# Patient Record
Sex: FEMALE | Race: OTHER | HISPANIC OR LATINO | Employment: OTHER | URBAN - METROPOLITAN AREA
[De-identification: names, ages, dates, MRNs, and addresses within clinical notes are randomized per-mention and may not be internally consistent; named-entity substitution may affect disease eponyms.]

---

## 2023-05-05 ENCOUNTER — HOSPITAL ENCOUNTER (OUTPATIENT)
Dept: DATA CONVERSION | Facility: HOSPITAL | Age: 48
End: 2023-05-05
Attending: PHYSICAL MEDICINE & REHABILITATION | Admitting: PHYSICAL MEDICINE & REHABILITATION
Payer: MEDICARE

## 2023-05-05 DIAGNOSIS — M79.18 MYALGIA, OTHER SITE: ICD-10-CM

## 2023-05-05 DIAGNOSIS — M54.16 RADICULOPATHY, LUMBAR REGION: ICD-10-CM

## 2023-05-05 DIAGNOSIS — M48.062 SPINAL STENOSIS, LUMBAR REGION WITH NEUROGENIC CLAUDICATION: ICD-10-CM

## 2023-09-07 VITALS — BODY MASS INDEX: 32.65 KG/M2 | WEIGHT: 220.46 LBS | HEIGHT: 69 IN

## 2023-09-14 NOTE — H&P
"    History & Physical Reviewed:   Pregnant/Lactating:  ·  Are You Pregnant no (1)   ·  Are You Currently Breastfeeding no (1)     I have reviewed the History and Physical dated:  17-Apr-2023   History and Physical reviewed and relevant findings noted. Patient examined to review pertinent physical  findings.: No significant changes   Home Medications Reviewed: no changes noted   Allergies Reviewed: no changes noted       ERAS (Enhanced Recovery After Surgery):  ·  ERAS Patient: no     Consent:   COVID-19 Consent:  ·  COVID-19 Risk Consent Surgeon has reviewed key risks related to the risk of stephanie COVID-19 and if they contract COVID-19 what the risks are.       Electronic Signatures:  Donell Mondragon (MD)  (Signed 05-May-2023 11:48)   Authored: History & Physical Reviewed, ERAS, Consent,  Note Completion      Last Updated: 05-May-2023 11:48 by Donell Mondragon (MD)    References:  1.  Data Referenced From \"Patient Profile - Procedure\" 05-May-2023 11:41   "

## 2023-09-15 PROBLEM — M54.50 LUMBAGO: Status: ACTIVE | Noted: 2023-09-15

## 2023-09-15 PROBLEM — M48.062 LUMBAR STENOSIS WITH NEUROGENIC CLAUDICATION: Status: ACTIVE | Noted: 2023-09-15

## 2023-09-15 PROBLEM — M54.12 CERVICAL RADICULOPATHY AT C6: Status: ACTIVE | Noted: 2023-09-15

## 2023-09-15 PROBLEM — M96.1 CERVICAL POSTLAMINECTOMY SYNDROME: Status: ACTIVE | Noted: 2023-09-15

## 2023-09-15 PROBLEM — Z79.891 LONG TERM PRESCRIPTION OPIATE USE: Status: ACTIVE | Noted: 2023-09-15

## 2023-09-15 PROBLEM — G54.2 CERVICAL NEUROPATHY: Status: ACTIVE | Noted: 2023-09-15

## 2023-09-15 PROBLEM — M54.16 LUMBAR RADICULOPATHY: Status: ACTIVE | Noted: 2023-09-15

## 2023-09-15 PROBLEM — M51.369 DDD (DEGENERATIVE DISC DISEASE), LUMBAR: Status: ACTIVE | Noted: 2023-09-15

## 2023-09-15 PROBLEM — M54.2 CHRONIC NECK PAIN: Status: ACTIVE | Noted: 2023-09-15

## 2023-09-15 PROBLEM — M51.36 DDD (DEGENERATIVE DISC DISEASE), LUMBAR: Status: ACTIVE | Noted: 2023-09-15

## 2023-09-15 PROBLEM — G89.29 CHRONIC NECK PAIN: Status: ACTIVE | Noted: 2023-09-15

## 2023-09-15 PROBLEM — M50.10 CERVICAL RADICULOPATHY DUE TO INTERVERTEBRAL DISC DISORDER: Status: ACTIVE | Noted: 2023-09-15

## 2023-09-15 PROBLEM — M79.18 MYOFASCIAL PAIN: Status: ACTIVE | Noted: 2023-09-15

## 2023-09-15 PROBLEM — G62.9 PERIPHERAL NEUROPATHY: Status: ACTIVE | Noted: 2023-09-15

## 2023-09-15 RX ORDER — GABAPENTIN 600 MG/1
1 TABLET ORAL 2 TIMES DAILY
COMMUNITY
End: 2023-10-17 | Stop reason: SDUPTHER

## 2023-09-15 RX ORDER — NYSTATIN 100000 U/G
CREAM TOPICAL 2 TIMES DAILY
COMMUNITY
Start: 2022-11-26 | End: 2023-10-17 | Stop reason: ALTCHOICE

## 2023-09-15 RX ORDER — METAXALONE 800 MG/1
1 TABLET ORAL 4 TIMES DAILY PRN
COMMUNITY
Start: 2020-10-21 | End: 2023-10-17 | Stop reason: ALTCHOICE

## 2023-09-15 RX ORDER — ATORVASTATIN CALCIUM 10 MG/1
10 TABLET, FILM COATED ORAL NIGHTLY
COMMUNITY
Start: 2023-02-08

## 2023-09-15 RX ORDER — ISOPROPYL ALCOHOL 70 ML/100ML
SWAB TOPICAL
COMMUNITY

## 2023-09-15 RX ORDER — VITAMIN B COMPLEX
CAPSULE ORAL
COMMUNITY

## 2023-09-15 RX ORDER — GLIPIZIDE 10 MG/1
1 TABLET ORAL EVERY 12 HOURS
COMMUNITY

## 2023-09-15 RX ORDER — LEVOTHYROXINE SODIUM 137 UG/1
50 TABLET ORAL DAILY
COMMUNITY
End: 2023-10-17 | Stop reason: ALTCHOICE

## 2023-09-15 RX ORDER — LIDOCAINE HYDROCHLORIDE MONOHYDRATE 0.5 MG/1
POWDER INTRADERMAL DAILY
COMMUNITY
End: 2024-01-31 | Stop reason: ALTCHOICE

## 2023-09-15 RX ORDER — LAMOTRIGINE 100 MG/1
1 TABLET ORAL 2 TIMES DAILY
COMMUNITY
Start: 2023-02-10

## 2023-09-15 RX ORDER — PANTOPRAZOLE SODIUM 40 MG/1
1 TABLET, DELAYED RELEASE ORAL EVERY 12 HOURS
COMMUNITY

## 2023-09-15 RX ORDER — METHOCARBAMOL 500 MG/1
TABLET, FILM COATED ORAL 3 TIMES DAILY
COMMUNITY
End: 2023-10-17 | Stop reason: SDUPTHER

## 2023-09-15 RX ORDER — SCOLOPAMINE TRANSDERMAL SYSTEM 1 MG/1
1 PATCH, EXTENDED RELEASE TRANSDERMAL
COMMUNITY
Start: 2023-03-09

## 2023-09-15 RX ORDER — LEVOTHYROXINE SODIUM 50 UG/1
TABLET ORAL
COMMUNITY
Start: 2023-04-22

## 2023-09-15 RX ORDER — OXYCODONE AND ACETAMINOPHEN 10; 325 MG/1; MG/1
1 TABLET ORAL 3 TIMES DAILY PRN
COMMUNITY
End: 2023-10-17 | Stop reason: SDUPTHER

## 2023-09-15 RX ORDER — AMITRIPTYLINE HYDROCHLORIDE 50 MG/1
TABLET, FILM COATED ORAL
COMMUNITY
Start: 2023-04-22 | End: 2023-10-17 | Stop reason: ALTCHOICE

## 2023-09-15 RX ORDER — AMLODIPINE BESYLATE 10 MG/1
1 TABLET ORAL DAILY
COMMUNITY
End: 2023-10-17 | Stop reason: ALTCHOICE

## 2023-09-15 RX ORDER — FERROUS GLUCONATE 324(38)MG
1 TABLET ORAL DAILY
COMMUNITY
Start: 2023-01-16

## 2023-09-15 RX ORDER — CITALOPRAM 10 MG/1
1 TABLET ORAL DAILY
COMMUNITY
End: 2023-10-17 | Stop reason: ALTCHOICE

## 2023-09-15 RX ORDER — AZELASTINE HYDROCHLORIDE 0.5 MG/ML
SOLUTION/ DROPS OPHTHALMIC
COMMUNITY
Start: 2020-04-27 | End: 2023-10-17 | Stop reason: ALTCHOICE

## 2023-09-15 RX ORDER — MEDROXYPROGESTERONE ACETATE 10 MG/1
1 TABLET ORAL EVERY MORNING
COMMUNITY
Start: 2022-09-21 | End: 2023-10-17 | Stop reason: ALTCHOICE

## 2023-09-15 RX ORDER — POLYETHYLENE GLYCOL-3350 AND ELECTROLYTES 236; 6.74; 5.86; 2.97; 22.74 G/274.31G; G/274.31G; G/274.31G; G/274.31G; G/274.31G
POWDER, FOR SOLUTION ORAL
COMMUNITY
Start: 2023-05-04 | End: 2023-10-17 | Stop reason: ALTCHOICE

## 2023-09-15 RX ORDER — NALOXONE HYDROCHLORIDE 4 MG/.1ML
SPRAY NASAL
COMMUNITY
Start: 2020-08-26 | End: 2024-01-31 | Stop reason: WASHOUT

## 2023-09-15 RX ORDER — BLOOD SUGAR DIAGNOSTIC
STRIP MISCELLANEOUS
COMMUNITY
Start: 2023-01-24

## 2023-09-15 RX ORDER — PRAZOSIN HYDROCHLORIDE 1 MG/1
CAPSULE ORAL
COMMUNITY
Start: 2023-04-22 | End: 2023-10-17 | Stop reason: ALTCHOICE

## 2023-09-15 RX ORDER — ARIPIPRAZOLE 10 MG/1
1 TABLET ORAL DAILY
COMMUNITY

## 2023-09-15 RX ORDER — METFORMIN HYDROCHLORIDE 1000 MG/1
1 TABLET ORAL 2 TIMES DAILY
COMMUNITY

## 2023-10-02 NOTE — OP NOTE
Post Operative Note:     PreOp Diagnosis: Lumbar stenosis with neurogenic  claudication   Post-Procedure Diagnosis: Lumbar stenosis with neurogenic  claudication   Procedure: 1.  L4-5 interlaminar ALINE  2.   3.   4.   5.   Surgeon: Donell Mondragon MD   Resident/Fellow/Other Assistant: Gerald Pina MD   Estimated Blood Loss (mL): none   Specimen: no   Complications: none apparent   Findings: expected anatomy     Operative Report Dictated:  Dictation: not applicable - note contains Operative  Report   Operative Report:    After informed consent was obtained, the patient was brought to the operating room and placed in the prone position.  The back area was prepped and draped in usual  sterile fashion.  Using fluoroscopic guidance, the skin and subcutaneous tissue overlying needle trajectory to the below interlaminar epidural space were anesthetized with a total of 5 mL of 0.5% lidocaine in the below paraspinous approach.  An 18-gauge  Tuohy needle was then advanced under fluoroscopic guidance with the below paraspinous approach into the below  interlaminar epidural space. Entry of the epidural space was confirmed using loss of resistance technique with a glass syringe and 2 mL of air.   Injection of Iohexol contrast revealed appropriate spread without vascular uptake. Subsequently, the below medications were injected.  The needle was removed.  The patient tolerated the procedure well.  The patient was then transferred to recovery room  in stable condition. The patient will participate in physical therapy, update us on his/her response, follow up with us on outpatient basis as needed.    Level: [L4-5]  Paraspinous approach laterality: Slightly right    Medications [4 mL of 0.5% lidocaine and 40 mg methylprednisolone]      Electronic Signatures:  Donell Mondragon)  (Signed 05-May-2023 12:19)   Authored: Post Operative Note, Note Completion      Last Updated: 05-May-2023 12:19 by Donell Mondragon)

## 2023-10-17 ENCOUNTER — OFFICE VISIT (OUTPATIENT)
Dept: PAIN MEDICINE | Facility: HOSPITAL | Age: 48
End: 2023-10-17
Payer: MEDICARE

## 2023-10-17 VITALS
DIASTOLIC BLOOD PRESSURE: 82 MMHG | BODY MASS INDEX: 33.63 KG/M2 | WEIGHT: 221.9 LBS | HEIGHT: 68 IN | RESPIRATION RATE: 16 BRPM | HEART RATE: 85 BPM | SYSTOLIC BLOOD PRESSURE: 118 MMHG

## 2023-10-17 DIAGNOSIS — M48.062 LUMBAR STENOSIS WITH NEUROGENIC CLAUDICATION: ICD-10-CM

## 2023-10-17 DIAGNOSIS — M62.838 MUSCLE SPASM: ICD-10-CM

## 2023-10-17 DIAGNOSIS — G62.89 OTHER POLYNEUROPATHY: ICD-10-CM

## 2023-10-17 DIAGNOSIS — M96.1 CERVICAL POSTLAMINECTOMY SYNDROME: Primary | ICD-10-CM

## 2023-10-17 PROCEDURE — 99214 OFFICE O/P EST MOD 30 MIN: CPT | Performed by: CLINICAL NURSE SPECIALIST

## 2023-10-17 PROCEDURE — 1036F TOBACCO NON-USER: CPT | Performed by: CLINICAL NURSE SPECIALIST

## 2023-10-17 RX ORDER — OXYCODONE AND ACETAMINOPHEN 10; 325 MG/1; MG/1
1 TABLET ORAL 3 TIMES DAILY PRN
Qty: 90 TABLET | Refills: 0 | Status: SHIPPED | OUTPATIENT
Start: 2023-10-18 | End: 2023-10-18

## 2023-10-17 RX ORDER — METHOCARBAMOL 500 MG/1
500 TABLET, FILM COATED ORAL 3 TIMES DAILY PRN
Qty: 90 TABLET | Refills: 2 | Status: SHIPPED | OUTPATIENT
Start: 2023-10-17 | End: 2023-10-18

## 2023-10-17 RX ORDER — GABAPENTIN 600 MG/1
600 TABLET ORAL 3 TIMES DAILY
Qty: 90 TABLET | Refills: 2 | Status: SHIPPED | OUTPATIENT
Start: 2023-10-17 | End: 2023-10-18

## 2023-10-17 ASSESSMENT — PATIENT HEALTH QUESTIONNAIRE - PHQ9
SUM OF ALL RESPONSES TO PHQ9 QUESTIONS 1 AND 2: 0
1. LITTLE INTEREST OR PLEASURE IN DOING THINGS: NOT AT ALL
2. FEELING DOWN, DEPRESSED OR HOPELESS: NOT AT ALL

## 2023-10-17 ASSESSMENT — ENCOUNTER SYMPTOMS
OCCASIONAL FEELINGS OF UNSTEADINESS: 0
LOSS OF SENSATION IN FEET: 0
DEPRESSION: 0

## 2023-10-17 ASSESSMENT — COLUMBIA-SUICIDE SEVERITY RATING SCALE - C-SSRS
2. HAVE YOU ACTUALLY HAD ANY THOUGHTS OF KILLING YOURSELF?: NO
6. HAVE YOU EVER DONE ANYTHING, STARTED TO DO ANYTHING, OR PREPARED TO DO ANYTHING TO END YOUR LIFE?: NO
1. IN THE PAST MONTH, HAVE YOU WISHED YOU WERE DEAD OR WISHED YOU COULD GO TO SLEEP AND NOT WAKE UP?: NO

## 2023-10-17 NOTE — PROGRESS NOTES
Pt discharged to SNF. Paperwork sent with patient. Per MD Palacios patient to obtain prescriptions through SNF. Tele removed. Belongings sent with daughter. Subjective   Patient ID: Breanne Obregon is a 48 y.o. female who presents for cervical radicular pain and lumbar radicular pain.      HPI  48 year old female with history of lumbar central canal stenosis resulting in lumbar radicular pain as well as cervical postlaminectomy syndrome. Followed by surgeon at the [facility] for cervical postlaminectomy syndrome with current symptoms stable. Lumbar radicular pain most bothersome. L MRI report is c/w multilevel degenerative changes with varying degrees of central canal stenosis. Her symptoms are consistent with lumbar stenosis with neurogenic claudication. Patient responded very well to recent interlaminar lumbar epidural steroid injection at L4/L5 receiving 90% relief lasting greater than 5 months.  Patient presents at today's office visit with chronic cervical pain radiating to left upper extremity.  Intermittent numbness and tingling left hand unchanged from previous exam.  She denies any weakness in her upper extremities.  Patient is also experiencing chronic low back pain radiating bilaterally into her hips and lower extremities to the level of her knee right greater than left.  She has chronic numbness and tingling bilateral feet.  She denies any weakness or changes in bowel/bladder function.  She continues to experience muscle tightness and spasm low back and lower extremities.  Spasms have improved with the addition of Robaxin.  Pain is described as aching and throbbing.  Pain increased with standing, bending and lifting.  No longer receiving relief from recent lumbar epidural steroid injection.  She would like to discuss repeating this injection in January and is hopeful to have relief throughout the winter.  Continues to manage her pain with a decreased dose of Percocet 10 mg 3 times per day as needed for pain.  She feels that the increased dose of gabapentin has been helpful for neuropathic/radicular pain.  She continues to do home exercises.    Location  "of Pain:  Patient returns to the office for interval re-evaluation of \"chronic lower back pain that radiates down the lateral and posterior side of bilateral legs stopping at the knee. pt states she has pain that radiates across the lower back.pt states she also has neck pain, describes as spasms. that radiates down the left arm into the hand causing pain and numbness. The pain is \"constant\" but varies in severity and continues to be worsened with activities of daily living while relieved by prescription medication.             Pain Score: 7/10      Treatment:   Percocet , TID    Last dose: 10/16/23    Medication Count: 8    Fill date: 9/18/23       Efficacy: 95% relief for 8 hours      Side Effects: none      Narcan: 01/24    Other pain medication/neuromodulator: gabapentin    Therapeutic Goals:    Therapeutic Assessment:    Injections and/or Procedures: Endorses lower back injections 85% for about two to three months. Is interested in repeating. Endorses having completed PT.       OARRS:  No data recorded  I have personally reviewed the OARRS report for Breanne Obregon. I have considered the risks of abuse, dependence, addiction and diversion    Is the patient prescribed a combination of a benzodiazepine and opioid?  No    Last Urine Drug Screen / ordered today: No  Recent Results (from the past 8760 hour(s))   Buprenorphine Confirm,Urine    Collection Time: 07/13/23  8:11 AM   Result Value Ref Range    BUPRENORPHINE GLUC, URINE <5 ng/mL    BUPRENORPHINE ,URINE <2 ng/mL    NALOXONE, URINE <100 ng/mL    NORBUPRENORPHINE GLUC,URINE <5 ng/mL    NORBUPRENORPHINE, URINE <2 ng/mL   Tapentadol Confirmation, Urine    Collection Time: 07/13/23  8:11 AM   Result Value Ref Range    Tapentadol <25 Cutoff <25 ng/mL    N-Desmethyltapentadol <25 Cutoff <25 ng/mL   OPIATE/OPIOID/BENZO PRESCRIPTION COMPLIANCE    Collection Time: 07/13/23  8:11 AM   Result Value Ref Range    DRUG SCREEN COMMENT URINE SEE BELOW     Creatine, " Urine 158.6 mg/dL    Amphetamine Screen, Urine PRESUMPTIVE NEGATIVE NEGATIVE    Barbiturate Screen, Urine PRESUMPTIVE NEGATIVE NEGATIVE    Cannabinoid Screen, Urine PRESUMPTIVE NEGATIVE NEGATIVE    Cocaine Screen, Urine PRESUMPTIVE NEGATIVE NEGATIVE    PCP Screen, Urine PRESUMPTIVE NEGATIVE NEGATIVE    7-Aminoclonazepam <25 Cutoff <25 ng/mL    Alpha-Hydroxyalprazolam <25 Cutoff <25 ng/mL    Alpha-Hydroxymidazolam <25 Cutoff <25 ng/mL    Alprazolam <25 Cutoff <25 ng/mL    Chlordiazepoxide <25 Cutoff <25 ng/mL    Clonazepam <25 Cutoff <25 ng/mL    Diazepam <25 Cutoff <25 ng/mL    Lorazepam <25 Cutoff <25 ng/mL    Midazolam <25 Cutoff <25 ng/mL    Nordiazepam <25 Cutoff <25 ng/mL    Oxazepam <25 Cutoff <25 ng/mL    Temazepam <25 Cutoff <25 ng/mL    Zolpidem <25 Cutoff <25 ng/mL    Zolpidem Metabolite (ZCA) <25 Cutoff <25 ng/mL    6-Acetylmorphine <25 Cutoff <25 ng/mL    Codeine <50 Cutoff <50 ng/mL    Hydrocodone <25 Cutoff <25 ng/mL    Hydromorphone <25 Cutoff <25 ng/mL    Morphine Urine <50 Cutoff <50 ng/mL    Norhydrocodone <25 Cutoff <25 ng/mL    Noroxycodone >1000 (A) Cutoff <25 ng/mL    Oxycodone >2500 (A) Cutoff <25 ng/mL    Oxymorphone >2500 (A) Cutoff <25 ng/mL    Tramadol <50 Cutoff <50 ng/mL    O-Desmethyltramadol <50 Cutoff <50 ng/mL    Fentanyl <2.5 Cutoff<2.5 ng/mL    Norfentanyl <2.5 Cutoff<2.5 ng/mL    METHADONE CONFIRMATION,URINE <25 Cutoff <25 ng/mL    EDDP <25 Cutoff <25 ng/mL     Results are as expected.     Controlled Substance Agreement:  Date of the Last Agreement: 10/17/23  Reviewed Controlled Substance Agreement including but not limited to the benefits, risks, and alternatives to treatment with a Controlled Substance medication(s).  Monitoring and compliance:    ORT: 10/17/23    PDUQ:7/13/23    Office Agreement:7/13/23      Review of Systems    ROS:   General: No fevers, chills, weight loss  Skin: Negative for lesions  Eyes: No acute vision changes  Ears: No vertigo  Nose, mouth, throat: No  difficulty swallowing or speaking  Respiratory: No cough, shortness of breath, cyanosis  Cardiovascular: Negative for chest pain syncope or palpitation  Gastrointestinal: No constipation, nausea, vomiting  Neurological: Negative for headache, positive for: Paresthesia and weakness  Psychological: Negative for severe or debilitating anxiety, depression. Negative memory loss  Musculoskeletal: Positive for arthralgia, myalgia, pain and spasm  Endocrine: Negative for weight gain, appetite changes, excessive sweating  Allergy/immune: Negative    All 13 systems were reviewed and are within normal levels except as noted or in the history of present illness.  Positive or pertinent negative responses are noted or were in the history of present illness. As noted, the patient denies significant or impairing weakness in the bilateral upper and lower extremities, medication induced constipation, and bowel or bladder incontinence.     Current Outpatient Medications:     Accu-Chek Guide test strips strip, TEST 3 TIMES A DAY AS DIRECTED, Disp: , Rfl:     alcohol swabs (BD Alcohol Swabs) pads, medicated, Apply topically., Disp: , Rfl:     amitriptyline (Elavil) 50 mg tablet, , Disp: , Rfl:     amLODIPine (Norvasc) 10 mg tablet, Take 1 tablet (10 mg) by mouth once daily., Disp: , Rfl:     ARIPiprazole (Abilify) 10 mg tablet, Take 1 tablet (10 mg) by mouth once daily., Disp: , Rfl:     atorvastatin (Lipitor) 10 mg tablet, Take 1 tablet (10 mg) by mouth once daily at bedtime., Disp: , Rfl:     azelastine (Optivar) 0.05 % ophthalmic solution, Azelastine HCl - 0.05 % Ophthalmic Solution  Quantity: 6  Refills: 0      Start : 27-Apr-2020  Active, Disp: , Rfl:     b complex vitamins (Vitamins B Complex) capsule, Vitamin B Complex CAPS  Refills: 0     Active, Disp: , Rfl:     citalopram (CeleXA) 10 mg tablet, Take 1 tablet (10 mg) by mouth once daily., Disp: , Rfl:     cyanocobalamin, vitamin B-12, (VITAMIN B-12 ORAL), Vitamin B 12 TABS   Refills: 0     Active, Disp: , Rfl:     ferrous gluconate 324 (38 Fe) MG tablet, Take 1 tablet (324 mg) by mouth once daily., Disp: , Rfl:     gabapentin (Neurontin) 600 mg tablet, Take 1 tablet (600 mg) by mouth 2 times a day., Disp: , Rfl:     GaviLyte-G 236-22.74-6.74 -5.86 gram solution, , Disp: , Rfl:     glipiZIDE (Glucotrol) 10 mg tablet, Take 1 tablet (10 mg) by mouth every 12 hours., Disp: , Rfl:     lamoTRIgine (LaMICtal) 100 mg tablet, Take 1 tablet (100 mg) by mouth 2 times a day., Disp: , Rfl:     levothyroxine (Synthroid) 137 mcg tablet, Take 50 mg by mouth once daily., Disp: , Rfl:     levothyroxine (Synthroid, Levoxyl) 50 mcg tablet, , Disp: , Rfl:     lidocaine (Zingo) 0.5 mg intradermal injection, Place on the skin once daily., Disp: , Rfl:     medroxyPROGESTERone (Provera) 10 mg tablet, Take 1 tablet (10 mg) by mouth once daily in the morning., Disp: , Rfl:     metaxalone (Skelaxin) 800 mg tablet, Take 1 tablet (800 mg) by mouth 4 times a day as needed for muscle spasms., Disp: , Rfl:     metFORMIN (Glucophage) 1,000 mg tablet, Take 1 tablet (1,000 mg) by mouth 2 times a day., Disp: , Rfl:     methocarbamol (Robaxin) 500 mg tablet, Take by mouth 3 times a day., Disp: , Rfl:     naloxone (Narcan) 4 mg/0.1 mL nasal spray, Spray 4 mg intranasally once if needed for overdose or respiratory depression, Disp: , Rfl:     nystatin (Mycostatin) cream, Apply topically 2 times a day. to affected area, Disp: , Rfl:     oxyCODONE-acetaminophen (Percocet)  mg tablet, Take 1 tablet by mouth 4 times a day as needed (for pain)., Disp: , Rfl:     pantoprazole (ProtoNix) 40 mg EC tablet, Take 1 tablet (40 mg) by mouth every 12 hours., Disp: , Rfl:     prazosin (Minipress) 1 mg capsule, , Disp: , Rfl:     pyridoxine HCl, vitamin B6, (VITAMIN B-6 ORAL), Vitamin B-6 TABS  Refills: 0     Active, Disp: , Rfl:     scopolamine (Transderm-Scop) 1 mg over 3 days patch 3 day, Place 1 patch on the skin every 3rd day. For  motion sickness, Disp: , Rfl:      Past Medical History:   Diagnosis Date    Personal history of other diseases of the musculoskeletal system and connective tissue     History of arthritis    Personal history of other mental and behavioral disorders     History of depression    Personal history of other mental and behavioral disorders     History of anxiety    Presence of spectacles and contact lenses     Wears glasses        Past Surgical History:   Procedure Laterality Date    OTHER SURGICAL HISTORY  2019    Gallbladder surgery    OTHER SURGICAL HISTORY  2019     section    OTHER SURGICAL HISTORY  2019    Toe amputation    OTHER SURGICAL HISTORY  2019    Thyroid surgery        Family History   Problem Relation Name Age of Onset    No Known Problems Other          Allergies   Allergen Reactions    Aspirin GI Upset    Morphine Unknown    Other Unknown     Nonsteroidal Anti-Inflammatory Agents        Objective     There were no vitals taken for this visit.     Physical Exam    General: Well-developed, well-nourished, no acute distress. The patient demonstrates no pain behavior, symptom magnification or overt drug-seeking behavior.  Eye: Pupils appropriate for room lighting  Neck/thyroid: No obvious goiter or enlargement of neck noted  Respiratory exam: Normal respiratory effort, unlabored respiration. No accessory muscle use noted  Cardiac exam: Bilateral radial pulses intact  Abdominal: Nondistended  Spine, cervical: Tenderness to paraspinous musculature cervical spine left greater than right.  Extension exacerbating pain radiating to left shoulder.  Rotational twisting increasing pain to left.  Spine, lumbar: The patient is able to rise from a seated to standing position without hesitancy, push off, or delay. Gait is grossly nonantalgic.  Slight forward leaning posture.  Tenderness to paraspinous musculature is noted lower lumbar spine.  Flexion intact with extension increasing pain.   Positive straight leg raise right lower extremity.  Neurologic exam: Muscle strength is antigravity in all 4 extremities.  Equal muscle strength bilateral lower extremities 5/5.  Normal sensation bilateral lower extremities.  Psychiatric exam: Judgment and insight normal, affect normal, speech is fluent, affect appropriate, demonstrating no signs of hypersomnolence, sedation, or confusion        Assessment/Plan   Problem List Items Addressed This Visit             ICD-10-CM    Cervical postlaminectomy syndrome - Primary M96.1    Relevant Medications    oxyCODONE-acetaminophen (Percocet)  mg tablet (Start on 10/18/2023)    gabapentin (Neurontin) 600 mg tablet    Peripheral neuropathy G62.9    Relevant Medications    gabapentin (Neurontin) 600 mg tablet    Lumbar stenosis with neurogenic claudication M48.062     48-year-old female with history of cervical postlaminectomy syndrome as well as lumbar stenosis with neurogenic claudication.  Patient previously responded well to lumbar epidural steroid injection L4-5 receiving greater than 90% relief lasting more than 5 months.  Previously completed formal physical therapy and does home exercises.  Presents at today's office visit with chronic low back pain radiating to bilateral lower extremities right greater than left as well as chronic cervical pain radiating to left upper extremity.  No changes in numbness/tingling or weakness.  No changes in bowel/bladder function.  Continues to manage her pain with a decreased dose of Percocet to 10 mg 3 times per day, increased dose of gabapentin 600 mg 3 times daily and the addition of Robaxin for muscle tightness and spasm.  Patient endorses greater than 95% relief with this pain regimen.  She is not ready to repeat her lumbar epidural steroid injection at this time.  She would like to hold off until January so that she may receive extended relief during the winter months.  Plan discussed with patient at today's office  visit.    -We will continue Percocet 10 mg 3 times daily at this time.  Discussed with patient that her next office visit we will discuss weaning the dose of Percocet to 10 mg 2 times per day or 7.5 mg 3 times per day.  We plan to continue decreasing the dose of opiate as she gains better relief with injections and additional adjuvant medications.  Patient verbalized agreement with this plan.  -Continue increased dose of gabapentin 600 mg 3 times daily. The patient was counseled on the risks and potential side effects of gabapentin as well as its importance for dosage titration. Side effects included but were not limited to, drowsiness, sedation, cognitive decline, peripheral edema, weight gain, seizures, with abrupt withdrawal.  Patient was instructed to call the office with any concerns or side effects before abruptly stopping medication.   -Continue Robaxin for muscle tightness/spasm.  Potential side effects reviewed with patient.  Currently tolerates without adverse effects.  -Patient would like to repeat lumbar epidural steroid injection L4-5 in January so that she may receive lasting benefits throughout the winter when her pain is more intense.  We will discuss repeating this injection at her next visit.  -Encourage patient to continue daily home exercises and stretches targeting cervical and lumbar pain.  -Patient will follow-up in 3 months or sooner if needed.    MEDICAL DECISION MAKING:    My impressions and treatment recommendations were discussed in detail with the patient, who verbalized understanding and had no further questions prior to discharge. Given the patient's report of reduced pain and improved functional ability without adverse effects,  it is reasonable to continue Percocet 10 mg 3 times per day as needed for pain at this time.  Further weaning to be discussed at her next visit. The terms of the opioid agreement as well as the potential risks and adverse effects of the patient's medication  regimen were discussed in detail. This includes if applicable due to dosage of medication permission to discuss and coordinate care with other treatment providers relevant to the patients condition. The patient verbalized understanding.    Treatment goals were discussed in detail with the patient.  These goals include reduction of pain levels, improved levels of functioning, avoidance of medication side effect and lowest medication dose possible to achieve  these goals.  The patient was in full agreement with these goals.  Also discussed is the understanding that pain may not be eliminated by medication but that the goal of a better sustaining life through use of medication is appropriate.  Lifestyle modifications including weight management, stretching, diet, exercise and smoking are addressed at each office visit.  Greater than 10 minutes spend counseling regarding smoking cessation given general health and comorbid status.  Options including medications 1 800 quit now discussed.      The patient provided a urine drug screen for routine monitoring and compliance.  This test may be given as frequently as every month based on the patient's individual opiate risk stratification and prescriber concerns for any aberrancies.  This test is indicated given the use of controlled substances for the patient's medical condition.  Unless otherwise noted the prior (s) urine drug testing results were consistent with prescribed medications.  There is no evidence of illicit drug use or additional medications ingested.     Risks and side effects of chronic opioid therapy including but not limited to tolerance, dependence, constipation, hyperalgesia, cognitive side effects, addiction and possible death due to overuse and or misuse were discussed. I also discussed that such medications when co-administered with other sedative agents including but not limited to alcohol, benzodiazepines, sedative hypnotics and illegal drugs could pose  life threatening consequences including death.  I also explained the impact that the administration of such medication has on a patient with obstructive sleep apnea and continued recommendations for use of apnea devices if ordered are prescribed by other physicians.  In order to effectively and safely treat the pain, I also emphasized the importance of compliance with the treatment plan as well as compliance with the terms of the opioid agreement which was reviewed in detail. I explained the importance of being responsible with the medications and to take these only as prescribed, never in excess and never for reasons other than pain reduction. The patient was counseled on keeping the medications safe and locked away from children and other adults as well as disposal methods and options. The patient understood the risks and instructions.      I also discussed with the patient in detail that based on the clinical response to the opioid medications and improvements of activities of daily living, sleep, and work performance in light of compliance with the treatment plan we can continue this form of therapy for the above chronic  pain.  The goal and rationale used for current treatment with chronic opioid medication is to control the pain and alleviate disability induced by the chronic pain condition noted above after failures of other non-opioid and nonpharmacological modalities to treat the chronic pain and the symptoms associated with have failed.  The patient understood the goals in terms of the above treatment plan and had no further questions prior to leaving the office today.    Given the patient's total MED, general use of daily opiates, or other coadministered medications in various classes the patient was offered a prescription for Narcan.  I instructed the patient that Narcan is for emergency use only and is worse suspected or known opiate overdose.  Call 911 prior to administration of this medication to  activate the emergency response team.  It is imperative to fill this medication in order to demonstrate understanding of the gravity of possible side effects including respiratory depression and risk of overdose of this opiate load or medication combination.  As such patients will be required to bring Narcan prescriptions to follow-up appointments as part of the compliance with continued opiate care.    Disclaimer: This note was transcribed using an audio transcription device.  As such, minor errors may be present with regard to spelling, punctuation, and inadvertent word insertion.  Please disregard such errors.            Relevant Medications    oxyCODONE-acetaminophen (Percocet)  mg tablet (Start on 10/18/2023)    gabapentin (Neurontin) 600 mg tablet     Other Visit Diagnoses         Codes    Muscle spasm     M62.838    Relevant Medications    methocarbamol (Robaxin) 500 mg tablet

## 2023-10-17 NOTE — ASSESSMENT & PLAN NOTE
48-year-old female with history of cervical postlaminectomy syndrome as well as lumbar stenosis with neurogenic claudication.  Patient previously responded well to lumbar epidural steroid injection L4-5 receiving greater than 90% relief lasting more than 5 months.  Previously completed formal physical therapy and does home exercises.  Presents at today's office visit with chronic low back pain radiating to bilateral lower extremities right greater than left as well as chronic cervical pain radiating to left upper extremity.  No changes in numbness/tingling or weakness.  No changes in bowel/bladder function.  Continues to manage her pain with a decreased dose of Percocet to 10 mg 3 times per day, increased dose of gabapentin 600 mg 3 times daily and the addition of Robaxin for muscle tightness and spasm.  Patient endorses greater than 95% relief with this pain regimen.  She is not ready to repeat her lumbar epidural steroid injection at this time.  She would like to hold off until January so that she may receive extended relief during the winter months.  Plan discussed with patient at today's office visit.    -We will continue Percocet 10 mg 3 times daily at this time.  Discussed with patient that her next office visit we will discuss weaning the dose of Percocet to 10 mg 2 times per day or 7.5 mg 3 times per day.  We plan to continue decreasing the dose of opiate as she gains better relief with injections and additional adjuvant medications.  Patient verbalized agreement with this plan.  -Continue increased dose of gabapentin 600 mg 3 times daily. The patient was counseled on the risks and potential side effects of gabapentin as well as its importance for dosage titration. Side effects included but were not limited to, drowsiness, sedation, cognitive decline, peripheral edema, weight gain, seizures, with abrupt withdrawal.  Patient was instructed to call the office with any concerns or side effects before abruptly  stopping medication.   -Continue Robaxin for muscle tightness/spasm.  Potential side effects reviewed with patient.  Currently tolerates without adverse effects.  -Patient would like to repeat lumbar epidural steroid injection L4-5 in January so that she may receive lasting benefits throughout the winter when her pain is more intense.  We will discuss repeating this injection at her next visit.  -Encourage patient to continue daily home exercises and stretches targeting cervical and lumbar pain.  -Patient will follow-up in 3 months or sooner if needed.    MEDICAL DECISION MAKING:    My impressions and treatment recommendations were discussed in detail with the patient, who verbalized understanding and had no further questions prior to discharge. Given the patient's report of reduced pain and improved functional ability without adverse effects,  it is reasonable to continue Percocet 10 mg 3 times per day as needed for pain at this time.  Further weaning to be discussed at her next visit. The terms of the opioid agreement as well as the potential risks and adverse effects of the patient's medication regimen were discussed in detail. This includes if applicable due to dosage of medication permission to discuss and coordinate care with other treatment providers relevant to the patients condition. The patient verbalized understanding.    Treatment goals were discussed in detail with the patient.  These goals include reduction of pain levels, improved levels of functioning, avoidance of medication side effect and lowest medication dose possible to achieve  these goals.  The patient was in full agreement with these goals.  Also discussed is the understanding that pain may not be eliminated by medication but that the goal of a better sustaining life through use of medication is appropriate.  Lifestyle modifications including weight management, stretching, diet, exercise and smoking are addressed at each office visit.   Greater than 10 minutes spend counseling regarding smoking cessation given general health and comorbid status.  Options including medications 1 800 quit now discussed.      The patient provided a urine drug screen for routine monitoring and compliance.  This test may be given as frequently as every month based on the patient's individual opiate risk stratification and prescriber concerns for any aberrancies.  This test is indicated given the use of controlled substances for the patient's medical condition.  Unless otherwise noted the prior (s) urine drug testing results were consistent with prescribed medications.  There is no evidence of illicit drug use or additional medications ingested.     Risks and side effects of chronic opioid therapy including but not limited to tolerance, dependence, constipation, hyperalgesia, cognitive side effects, addiction and possible death due to overuse and or misuse were discussed. I also discussed that such medications when co-administered with other sedative agents including but not limited to alcohol, benzodiazepines, sedative hypnotics and illegal drugs could pose life threatening consequences including death.  I also explained the impact that the administration of such medication has on a patient with obstructive sleep apnea and continued recommendations for use of apnea devices if ordered are prescribed by other physicians.  In order to effectively and safely treat the pain, I also emphasized the importance of compliance with the treatment plan as well as compliance with the terms of the opioid agreement which was reviewed in detail. I explained the importance of being responsible with the medications and to take these only as prescribed, never in excess and never for reasons other than pain reduction. The patient was counseled on keeping the medications safe and locked away from children and other adults as well as disposal methods and options. The patient understood the  risks and instructions.      I also discussed with the patient in detail that based on the clinical response to the opioid medications and improvements of activities of daily living, sleep, and work performance in light of compliance with the treatment plan we can continue this form of therapy for the above chronic  pain.  The goal and rationale used for current treatment with chronic opioid medication is to control the pain and alleviate disability induced by the chronic pain condition noted above after failures of other non-opioid and nonpharmacological modalities to treat the chronic pain and the symptoms associated with have failed.  The patient understood the goals in terms of the above treatment plan and had no further questions prior to leaving the office today.    Given the patient's total MED, general use of daily opiates, or other coadministered medications in various classes the patient was offered a prescription for Narcan.  I instructed the patient that Narcan is for emergency use only and is worse suspected or known opiate overdose.  Call 911 prior to administration of this medication to activate the emergency response team.  It is imperative to fill this medication in order to demonstrate understanding of the gravity of possible side effects including respiratory depression and risk of overdose of this opiate load or medication combination.  As such patients will be required to bring Narcan prescriptions to follow-up appointments as part of the compliance with continued opiate care.    Disclaimer: This note was transcribed using an audio transcription device.  As such, minor errors may be present with regard to spelling, punctuation, and inadvertent word insertion.  Please disregard such errors.

## 2023-10-18 RX ORDER — METHOCARBAMOL 500 MG/1
500 TABLET, FILM COATED ORAL 3 TIMES DAILY PRN
Qty: 90 TABLET | Refills: 2 | Status: SHIPPED | OUTPATIENT
Start: 2023-10-18 | End: 2024-01-31 | Stop reason: SDUPTHER

## 2023-10-18 RX ORDER — OXYCODONE AND ACETAMINOPHEN 10; 325 MG/1; MG/1
1 TABLET ORAL 3 TIMES DAILY PRN
Qty: 90 TABLET | Refills: 0 | Status: SHIPPED | OUTPATIENT
Start: 2023-10-18 | End: 2023-11-17 | Stop reason: SDUPTHER

## 2023-10-18 RX ORDER — GABAPENTIN 600 MG/1
600 TABLET ORAL 3 TIMES DAILY
Qty: 90 TABLET | Refills: 2 | Status: SHIPPED | OUTPATIENT
Start: 2023-10-18 | End: 2024-01-31 | Stop reason: SDUPTHER

## 2023-11-17 DIAGNOSIS — M48.062 LUMBAR STENOSIS WITH NEUROGENIC CLAUDICATION: ICD-10-CM

## 2023-11-17 DIAGNOSIS — M96.1 CERVICAL POSTLAMINECTOMY SYNDROME: ICD-10-CM

## 2023-11-17 RX ORDER — OXYCODONE AND ACETAMINOPHEN 10; 325 MG/1; MG/1
1 TABLET ORAL 3 TIMES DAILY PRN
Qty: 90 TABLET | Refills: 0 | Status: SHIPPED | OUTPATIENT
Start: 2023-11-18 | End: 2023-12-13 | Stop reason: SDUPTHER

## 2023-11-17 NOTE — TELEPHONE ENCOUNTER
Pt is requesting refill of   Percocet  mg 1 tablet TID [facility]                                                        LV:  10/17/23                  NV:  1/15/24                OARRS reviewed with LFD:   10/19/23  #90/30 days                         Pended RX to SHARA Chand for transmission to pharmacy.

## 2023-12-13 DIAGNOSIS — M48.062 LUMBAR STENOSIS WITH NEUROGENIC CLAUDICATION: ICD-10-CM

## 2023-12-13 DIAGNOSIS — M96.1 CERVICAL POSTLAMINECTOMY SYNDROME: ICD-10-CM

## 2023-12-13 NOTE — TELEPHONE ENCOUNTER
Pt is requesting refill of  Percocet  mg 1 tablet TID [facility]                                                        LV:  10/17/23                  NV:  1/15/24                OARRS reviewed with LFD:   11/18/23   #90/30 days                         Pended RX to SHARA Chand for transmission to pharmacy.

## 2023-12-14 RX ORDER — OXYCODONE AND ACETAMINOPHEN 10; 325 MG/1; MG/1
1 TABLET ORAL 3 TIMES DAILY PRN
Qty: 90 TABLET | Refills: 0 | Status: SHIPPED | OUTPATIENT
Start: 2023-12-18 | End: 2024-01-17

## 2024-01-15 ENCOUNTER — APPOINTMENT (OUTPATIENT)
Dept: PAIN MEDICINE | Facility: HOSPITAL | Age: 49
End: 2024-01-15
Payer: MEDICARE

## 2024-01-31 ENCOUNTER — OFFICE VISIT (OUTPATIENT)
Dept: PAIN MEDICINE | Facility: HOSPITAL | Age: 49
End: 2024-01-31
Payer: MEDICARE

## 2024-01-31 VITALS
HEIGHT: 68 IN | BODY MASS INDEX: 32.87 KG/M2 | RESPIRATION RATE: 16 BRPM | WEIGHT: 216.9 LBS | SYSTOLIC BLOOD PRESSURE: 117 MMHG | DIASTOLIC BLOOD PRESSURE: 77 MMHG | HEART RATE: 92 BPM

## 2024-01-31 DIAGNOSIS — Z79.891 LONG TERM PRESCRIPTION OPIATE USE: Primary | ICD-10-CM

## 2024-01-31 DIAGNOSIS — M96.1 CERVICAL POSTLAMINECTOMY SYNDROME: ICD-10-CM

## 2024-01-31 DIAGNOSIS — M54.16 LUMBAR RADICULOPATHY: ICD-10-CM

## 2024-01-31 DIAGNOSIS — G62.89 OTHER POLYNEUROPATHY: ICD-10-CM

## 2024-01-31 DIAGNOSIS — M62.838 MUSCLE SPASM: ICD-10-CM

## 2024-01-31 DIAGNOSIS — M48.062 LUMBAR STENOSIS WITH NEUROGENIC CLAUDICATION: ICD-10-CM

## 2024-01-31 LAB
AMPHETAMINES UR QL SCN: NORMAL
BARBITURATES UR QL SCN: NORMAL
BZE UR QL SCN: NORMAL
CANNABINOIDS UR QL SCN: NORMAL
CREAT UR-MCNC: 152.5 MG/DL (ref 20–320)
ETHANOL ?TM UR-MCNC: <10 MG/DL
PCP UR QL SCN: NORMAL

## 2024-01-31 PROCEDURE — 82570 ASSAY OF URINE CREATININE: CPT | Mod: 59 | Performed by: CLINICAL NURSE SPECIALIST

## 2024-01-31 PROCEDURE — 80346 BENZODIAZEPINES1-12: CPT | Mod: PORLAB | Performed by: CLINICAL NURSE SPECIALIST

## 2024-01-31 PROCEDURE — 99214 OFFICE O/P EST MOD 30 MIN: CPT | Performed by: CLINICAL NURSE SPECIALIST

## 2024-01-31 PROCEDURE — 80307 DRUG TEST PRSMV CHEM ANLYZR: CPT | Performed by: CLINICAL NURSE SPECIALIST

## 2024-01-31 PROCEDURE — 80348 DRUG SCREENING BUPRENORPHINE: CPT | Performed by: CLINICAL NURSE SPECIALIST

## 2024-01-31 PROCEDURE — 1036F TOBACCO NON-USER: CPT | Performed by: CLINICAL NURSE SPECIALIST

## 2024-01-31 PROCEDURE — 80372 DRUG SCREENING TAPENTADOL: CPT | Mod: MUE | Performed by: CLINICAL NURSE SPECIALIST

## 2024-01-31 RX ORDER — LIDOCAINE 50 MG/G
1 PATCH TOPICAL DAILY
COMMUNITY

## 2024-01-31 RX ORDER — METHOCARBAMOL 500 MG/1
500 TABLET, FILM COATED ORAL 3 TIMES DAILY PRN
Qty: 90 TABLET | Refills: 2 | Status: SHIPPED | OUTPATIENT
Start: 2024-01-31 | End: 2024-04-01 | Stop reason: SDUPTHER

## 2024-01-31 RX ORDER — NALOXONE HYDROCHLORIDE 4 MG/.1ML
4 SPRAY NASAL AS NEEDED
Qty: 2 EACH | Refills: 0 | Status: SHIPPED | OUTPATIENT
Start: 2024-01-31 | End: 2024-02-01

## 2024-01-31 RX ORDER — OXYCODONE AND ACETAMINOPHEN 10; 325 MG/1; MG/1
1 TABLET ORAL 2 TIMES DAILY PRN
Qty: 60 TABLET | Refills: 0 | Status: SHIPPED | OUTPATIENT
Start: 2024-01-31 | End: 2024-02-27 | Stop reason: SDUPTHER

## 2024-01-31 RX ORDER — GABAPENTIN 600 MG/1
600 TABLET ORAL 3 TIMES DAILY
Qty: 90 TABLET | Refills: 2 | Status: SHIPPED | OUTPATIENT
Start: 2024-01-31 | End: 2024-04-01 | Stop reason: SDUPTHER

## 2024-01-31 ASSESSMENT — COLUMBIA-SUICIDE SEVERITY RATING SCALE - C-SSRS
1. IN THE PAST MONTH, HAVE YOU WISHED YOU WERE DEAD OR WISHED YOU COULD GO TO SLEEP AND NOT WAKE UP?: NO
2. HAVE YOU ACTUALLY HAD ANY THOUGHTS OF KILLING YOURSELF?: NO
6. HAVE YOU EVER DONE ANYTHING, STARTED TO DO ANYTHING, OR PREPARED TO DO ANYTHING TO END YOUR LIFE?: NO

## 2024-01-31 ASSESSMENT — PATIENT HEALTH QUESTIONNAIRE - PHQ9
1. LITTLE INTEREST OR PLEASURE IN DOING THINGS: NOT AT ALL
2. FEELING DOWN, DEPRESSED OR HOPELESS: NOT AT ALL
SUM OF ALL RESPONSES TO PHQ9 QUESTIONS 1 AND 2: 0

## 2024-01-31 ASSESSMENT — ENCOUNTER SYMPTOMS
OCCASIONAL FEELINGS OF UNSTEADINESS: 0
DEPRESSION: 0
LOSS OF SENSATION IN FEET: 0

## 2024-01-31 NOTE — ASSESSMENT & PLAN NOTE
48-year-old female with history of cervical postlaminectomy syndrome as well as symptoms consistent with lumbar stenosis with neurogenic claudication.  Patient previously responded well to lumbar epidural steroid injection L4-5 receiving greater than 90% relief lasting more than 5 months.  Previously completed formal physical therapy and does home exercises.  Presents at today's office visit with chronic low back pain radiating to bilateral lower extremities right greater than left as well as chronic cervical pain radiating to left upper extremity.  Slight increase in numbness and tingling to right foot as well as some increase in weakness to right lower extremity.  Otherwise, no changes in neurologic symptoms to upper or lower extremities.  Continues to manage her pain with a decreased dose of Percocet to 10 mg 3 times per day, increased dose of gabapentin 600 mg 3 times daily and the addition of Robaxin for muscle tightness and spasm.  Continues to endorse greater than 95% relief with this pain regimen.  She would like to repeat her lumbar epidural steroid injection at this time as she received significant relief with her last injection.  We also discussed that as she receives greater relief with the increased dose of gabapentin and the addition of Robaxin as well as repeating injections we would like to continue to wean her opiate dose to the lowest dose possible.  We will decrease her Percocet to 10 mg twice daily as needed for pain.  We will evaluate the patient for further weaning based on results of the above-noted therapy.  Plan reviewed with patient at today's office visit.    -Patient scheduled for interlaminar lumbar epidural steroid injection at L4-5 under fluoroscopic guidance.  Risks and benefits reviewed with patient and she has agreed to proceed.  CPT 56611.  -We will continue to wean the patient's Percocet dose as she gains benefit from injections and increase in neuromodulator as well as muscle  relaxant.  We will decrease Percocet to 10 mg 2 times per day.  Further weaning may include a decrease to Percocet 7.5 mg 2 times daily.  Further discussion about weaning at the patient's next office visit.  -Continue gabapentin 600 mg 3 times daily.  Currently tolerating without adverse effects.  The patient was counseled on the risks and potential side effects of gabapentin as well as its importance for dosage titration. Side effects included but were not limited to, drowsiness, sedation, cognitive decline, peripheral edema, weight gain, seizures, with abrupt withdrawal.  Patient was instructed to call the office with any concerns or side effects before abruptly stopping medication.   -Robaxin 500 mg up to 3 times per day as needed for muscle tightness and spasm.  Currently tolerating without adverse effects.  -Advised patient to continue home exercises on a consistent basis targeting cervical and lumbar pain.  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  -Follow up in 4 weeks after epidural steroid injection.        MEDICAL DECISION MAKING:    My impressions and treatment recommendations were discussed in detail with the patient, who verbalized understanding and had no further questions prior to discharge. Given the patient's report of reduced pain and improved functional ability without adverse effects,  we will continue to wean opiates.  It is reasonable to Percocet 10 mg to 2 times per day as needed for 30 days.  The terms of the opioid agreement as well as the potential risks and adverse effects of the patient's medication regimen were discussed in detail. This includes if applicable due to dosage of medication permission to discuss and coordinate care with other treatment providers relevant to the patients condition. The patient verbalized understanding.    Treatment goals were discussed in detail with the patient.  These goals  include reduction of pain levels, improved levels of functioning, avoidance of medication side effect and lowest medication dose possible to achieve  these goals.  The patient was in full agreement with these goals.  Also discussed is the understanding that pain may not be eliminated by medication but that the goal of a better sustaining life through use of medication is appropriate.  Lifestyle modifications including weight management, stretching, diet, exercise and smoking are addressed at each office visit.  The patient provided a urine drug screen for routine monitoring and compliance.  This test may be given as frequently as every month based on the patient's individual opiate risk stratification and prescriber concerns for any aberrancies.  This test is indicated given the use of controlled substances for the patient's medical condition.  Unless otherwise noted the prior (s) urine drug testing results were consistent with prescribed medications.  There is no evidence of illicit drug use or additional medications ingested.     Risks and side effects of chronic opioid therapy including but not limited to tolerance, dependence, constipation, hyperalgesia, cognitive side effects, addiction and possible death due to overuse and or misuse were discussed. I also discussed that such medications when co-administered with other sedative agents including but not limited to alcohol, benzodiazepines, sedative hypnotics and illegal drugs could pose life threatening consequences including death.  I also explained the impact that the administration of such medication has on a patient with obstructive sleep apnea and continued recommendations for use of apnea devices if ordered are prescribed by other physicians.  In order to effectively and safely treat the pain, I also emphasized the importance of compliance with the treatment plan as well as compliance with the terms of the opioid agreement which was reviewed in detail. I  explained the importance of being responsible with the medications and to take these only as prescribed, never in excess and never for reasons other than pain reduction. The patient was counseled on keeping the medications safe and locked away from children and other adults as well as disposal methods and options. The patient understood the risks and instructions.      I also discussed with the patient in detail that based on the clinical response to the opioid medications and improvements of activities of daily living, sleep, and work performance in light of compliance with the treatment plan we can continue this form of therapy for the above chronic  pain.  The goal and rationale used for current treatment with chronic opioid medication is to control the pain and alleviate disability induced by the chronic pain condition noted above after failures of other non-opioid and nonpharmacological modalities to treat the chronic pain and the symptoms associated with have failed.  The patient understood the goals in terms of the above treatment plan and had no further questions prior to leaving the office today.    Given the patient's total MED, general use of daily opiates, or other coadministered medications in various classes the patient was offered a prescription for Narcan.  I instructed the patient that Narcan is for emergency use only and is worse suspected or known opiate overdose.  Call 911 prior to administration of this medication to activate the emergency response team.  It is imperative to fill this medication in order to demonstrate understanding of the gravity of possible side effects including respiratory depression and risk of overdose of this opiate load or medication combination.  As such patients will be required to bring Narcan prescriptions to follow-up appointments as part of the compliance with continued opiate care.    Disclaimer: This note was transcribed using an audio transcription device.  As  such, minor errors may be present with regard to spelling, punctuation, and inadvertent word insertion.  Please disregard such errors.

## 2024-01-31 NOTE — PROGRESS NOTES
Subjective   Patient ID: Breanne Obregon is a 48 y.o. female who presents for cervical radicular lumbar radicular pain    HPI  48 year old female with history of lumbar central canal stenosis resulting in lumbar radicular pain as well as cervical postlaminectomy syndrome. Followed by surgeon at the [facility] for cervical postlaminectomy syndrome with current symptoms stable. Lumbar radicular pain most bothersome. L MRI report is c/w multilevel degenerative changes with varying degrees of central canal stenosis. Her symptoms are consistent with lumbar stenosis with neurogenic claudication. Patient responded very well to recent interlaminar lumbar epidural steroid injection at L4/L5 receiving 90% relief lasting greater than 5 months.   Previously completed physical therapy with limited relief.  Manages her pain with Percocet 10 mg up to 3 times per day which we have been weaning to prevent tolerance and hyperalgesia.  Increased her gabapentin to 600 mg 3 times daily.  Added Robaxin for muscle tightness and spasm.  Patient presents at today's office visit with persistent cervical radicular symptoms.  Cervical pain radiating most often to left upper extremity with numbness and tingling to left hand unchanged from previous exam.  She denies weakness in bilateral upper extremities.  She is continues to experience chronic low back pain which radiates bilaterally into her hips and lower extremities to just below her knees.  She has chronic numbness and tingling to bilateral feet which she states has increased in her right foot where she had toes amputated previously.  She has noticed increasing weakness to her right lower extremity.  She denies any changes in bowel/bladder function.  She no longer is receiving relief from most recent lumbar epidural steroid injection.  Her last lumbar epidural steroid injection was 5/5/2023 which provided greater than 90% pain relief lasting approximately 5 months.  She would like to  "discuss repeating this injection as she received excellent relief.  She currently is managing her pain with Percocet 10 mg 3 times per day as needed for pain.  She ran out of her medication secondary to rescheduling an appointment due to illness.  Continues to benefit from increased dose of gabapentin 600 mg 3 times daily and Robaxin.  She is continuing home therapy exercises.      location of Pain:  Patient returns to the office for interval re-evaluation of \"chronic lower back pain that radiates down the lateral and posterior side of bilateral legs stopping at the knee. pt states she has pain that radiates across the lower back.pt states she also has neck pain, describes as spasms. that radiates down the left arm into the hand causing pain and numbness. Three toes amputated causing foot pain.     Pain Score: 8/10    Treatment:   Percocet , TID  Last dose: 1/18/23 3 doses  Medication Count: 0 pills left in rx bottle  Fill date: 12/1823     Efficacy: 95% relief for 8 hours    Side Effects: none    Narcan: 01/24- send to pharmacy    Other pain medication/neuromodulator: gabapentin- needs refill/Robaxin-needs refill     Injections and/or Procedures: LESI about a year ago with over 80% relief for several months would like to repeat    OARRS:  Tiffany Iqbal, APRN-CNP, APRN-CNS on 1/31/2024  2:29 PM  I have personally reviewed the OARRS report for Breanne Obregon. I have considered the risks of abuse, dependence, addiction and diversion    Is the patient prescribed a combination of a benzodiazepine and opioid?  No    Last Urine Drug Screen / ordered today: Yes  Recent Results (from the past 8760 hour(s))   Buprenorphine Confirm,Urine    Collection Time: 07/13/23  8:11 AM   Result Value Ref Range    BUPRENORPHINE GLUC, URINE <5 ng/mL    BUPRENORPHINE ,URINE <2 ng/mL    NALOXONE, URINE <100 ng/mL    NORBUPRENORPHINE GLUC,URINE <5 ng/mL    NORBUPRENORPHINE, URINE <2 ng/mL   Tapentadol Confirmation, Urine    Collection " Time: 07/13/23  8:11 AM   Result Value Ref Range    Tapentadol <25 Cutoff <25 ng/mL    N-Desmethyltapentadol <25 Cutoff <25 ng/mL   OPIATE/OPIOID/BENZO PRESCRIPTION COMPLIANCE    Collection Time: 07/13/23  8:11 AM   Result Value Ref Range    DRUG SCREEN COMMENT URINE SEE BELOW     Creatine, Urine 158.6 mg/dL    Amphetamine Screen, Urine PRESUMPTIVE NEGATIVE NEGATIVE    Barbiturate Screen, Urine PRESUMPTIVE NEGATIVE NEGATIVE    Cannabinoid Screen, Urine PRESUMPTIVE NEGATIVE NEGATIVE    Cocaine Screen, Urine PRESUMPTIVE NEGATIVE NEGATIVE    PCP Screen, Urine PRESUMPTIVE NEGATIVE NEGATIVE    7-Aminoclonazepam <25 Cutoff <25 ng/mL    Alpha-Hydroxyalprazolam <25 Cutoff <25 ng/mL    Alpha-Hydroxymidazolam <25 Cutoff <25 ng/mL    Alprazolam <25 Cutoff <25 ng/mL    Chlordiazepoxide <25 Cutoff <25 ng/mL    Clonazepam <25 Cutoff <25 ng/mL    Diazepam <25 Cutoff <25 ng/mL    Lorazepam <25 Cutoff <25 ng/mL    Midazolam <25 Cutoff <25 ng/mL    Nordiazepam <25 Cutoff <25 ng/mL    Oxazepam <25 Cutoff <25 ng/mL    Temazepam <25 Cutoff <25 ng/mL    Zolpidem <25 Cutoff <25 ng/mL    Zolpidem Metabolite (ZCA) <25 Cutoff <25 ng/mL    6-Acetylmorphine <25 Cutoff <25 ng/mL    Codeine <50 Cutoff <50 ng/mL    Hydrocodone <25 Cutoff <25 ng/mL    Hydromorphone <25 Cutoff <25 ng/mL    Morphine Urine <50 Cutoff <50 ng/mL    Norhydrocodone <25 Cutoff <25 ng/mL    Noroxycodone >1000 (A) Cutoff <25 ng/mL    Oxycodone >2500 (A) Cutoff <25 ng/mL    Oxymorphone >2500 (A) Cutoff <25 ng/mL    Tramadol <50 Cutoff <50 ng/mL    O-Desmethyltramadol <50 Cutoff <50 ng/mL    Fentanyl <2.5 Cutoff<2.5 ng/mL    Norfentanyl <2.5 Cutoff<2.5 ng/mL    METHADONE CONFIRMATION,URINE <25 Cutoff <25 ng/mL    EDDP <25 Cutoff <25 ng/mL     Results are as expected.     Controlled Substance Agreement: 10/17/23  Date of the Last Agreement: 10/17/23  Reviewed Controlled Substance Agreement including but not limited to the benefits, risks, and alternatives to treatment with a  Controlled Substance medication(s).    Monitoring and compliance: 1/31/24    ORT: 10/17/23    PDUQ: 1/31/24 score 7    Office Agreement: 7/13/23      Review of Systems    ROS:   General: No fevers, chills, weight loss  Skin: Negative for lesions  Eyes: No acute vision changes  Ears: No vertigo  Nose, mouth, throat: No difficulty swallowing or speaking  Respiratory: No cough, shortness of breath, cyanosis  Cardiovascular: Negative for chest pain syncope or palpitation  Gastrointestinal: No constipation, nausea, vomiting  Neurological: Negative for headache, positive for: Paresthesia and weakness right lower extremity  Psychological: Negative for severe or debilitating anxiety, depression. Negative memory loss  Musculoskeletal: Positive for arthralgia, myalgia, pain and spasm  Endocrine: Negative for weight gain, appetite changes, excessive sweating  Allergy/immune: Negative    All 13 systems were reviewed and are within normal levels except as noted or in the history of present illness.  Positive or pertinent negative responses are noted or were in the history of present illness. As noted, the patient denies significant or impairing weakness in the bilateral upper and lower extremities, medication induced constipation, and bowel or bladder incontinence.     Current Outpatient Medications:     Accu-Chek Guide test strips strip, TEST 3 TIMES A DAY AS DIRECTED, Disp: , Rfl:     alcohol swabs (BD Alcohol Swabs) pads, medicated, Apply topically., Disp: , Rfl:     ARIPiprazole (Abilify) 10 mg tablet, Take 1 tablet (10 mg) by mouth once daily., Disp: , Rfl:     atorvastatin (Lipitor) 10 mg tablet, Take 1 tablet (10 mg) by mouth once daily at bedtime., Disp: , Rfl:     b complex vitamins (Vitamins B Complex) capsule, Vitamin B Complex CAPS  Refills: 0     Active, Disp: , Rfl:     ferrous gluconate 324 (38 Fe) MG tablet, Take 1 tablet (324 mg) by mouth once daily., Disp: , Rfl:     glipiZIDE (Glucotrol) 10 mg tablet, Take 1  tablet (10 mg) by mouth every 12 hours., Disp: , Rfl:     lamoTRIgine (LaMICtal) 100 mg tablet, Take 1 tablet (100 mg) by mouth 2 times a day., Disp: , Rfl:     levothyroxine (Synthroid, Levoxyl) 50 mcg tablet, , Disp: , Rfl:     lidocaine (Lidoderm) 5 % patch, Place 1 patch on the skin once daily. Remove & discard patch within 12 hours or as directed by MD., Disp: , Rfl:     metFORMIN (Glucophage) 1,000 mg tablet, Take 1 tablet (1,000 mg) by mouth 2 times a day., Disp: , Rfl:     pantoprazole (ProtoNix) 40 mg EC tablet, Take 1 tablet (40 mg) by mouth every 12 hours., Disp: , Rfl:     pyridoxine HCl, vitamin B6, (VITAMIN B-6 ORAL), Vitamin B-6 TABS  Refills: 0     Active, Disp: , Rfl:     scopolamine (Transderm-Scop) 1 mg over 3 days patch 3 day, Place 1 patch on the skin every 3rd day. For motion sickness, Disp: , Rfl:     gabapentin (Neurontin) 600 mg tablet, Take 1 tablet (600 mg) by mouth 3 times a day., Disp: 90 tablet, Rfl: 2    methocarbamol (Robaxin) 500 mg tablet, Take 1 tablet (500 mg) by mouth 3 times a day as needed for muscle spasms., Disp: 90 tablet, Rfl: 2    naloxone (Narcan) 4 mg/0.1 mL nasal spray, Administer 1 spray (4 mg) into affected nostril(s) if needed for opioid reversal for up to 1 day. May repeat every 2-3 minutes if needed, alternating nostrils, until medical assistance becomes available., Disp: 2 each, Rfl: 0    oxyCODONE-acetaminophen (Percocet)  mg tablet, Take 1 tablet by mouth 2 times a day as needed for severe pain (7 - 10)., Disp: 60 tablet, Rfl: 0     Past Medical History:   Diagnosis Date    Personal history of other diseases of the musculoskeletal system and connective tissue     History of arthritis    Personal history of other mental and behavioral disorders     History of depression    Personal history of other mental and behavioral disorders     History of anxiety    Presence of spectacles and contact lenses     Wears glasses        Past Surgical History:   Procedure  "Laterality Date    OTHER SURGICAL HISTORY  2019    Gallbladder surgery    OTHER SURGICAL HISTORY  2019     section    OTHER SURGICAL HISTORY  2019    Toe amputation    OTHER SURGICAL HISTORY  2019    Thyroid surgery        Family History   Problem Relation Name Age of Onset    No Known Problems Other          Allergies   Allergen Reactions    Aspirin GI Upset    Morphine Unknown    Other Unknown     Nonsteroidal Anti-Inflammatory Agents        Objective     Visit Vitals  /77   Pulse 92   Resp 16 Comment: 02 99   Ht 1.727 m (5' 8\")   Wt 98.4 kg (216 lb 14.4 oz)   BMI 32.98 kg/m²   Smoking Status Never   BSA 2.17 m²        Physical Exam    PE:  General: Well-developed, well-nourished, no acute distress. The patient demonstrates no pain behavior, symptom magnification or overt drug-seeking behavior.  Eye: Pupils appropriate for room lighting  Neck/thyroid: No obvious goiter or enlargement of neck noted  Respiratory exam: Normal respiratory effort, unlabored respiration. No accessory muscle use noted  Cardiac exam: Bilateral radial pulses intact  Abdominal: Nondistended  Spine, cervical: Tenderness to paraspinous musculature cervical spine left greater than right.  Flexion intact with extension increasing symptoms radiating to left shoulder.  Rotational twisting increasing pain to the left.  Spine, lumbar: The patient is able to rise from a seated to standing position without hesitancy, push off, or delay. Gait is grossly nonantalgic.  Slightly forward leaning posture.  Tenderness to paraspinous musculature is noted lower lumbar spine.  Flexion intact with extension increasing pain right greater than left.  Positive straight leg raise right lower extremity.  Neurologic exam: Muscle strength is antigravity in all 4 extremities.  Equal muscle strength bilateral lower extremities 5/5.  Normal sensation bilateral lower extremities.  Bilateral feet are warm to touch with 2+ DP " pulses.  Psychiatric exam: Judgment and insight normal, affect normal, speech is fluent, affect appropriate, demonstrating no signs of hypersomnolence, sedation, or confusion          Assessment/Plan   Problem List Items Addressed This Visit             ICD-10-CM    Cervical postlaminectomy syndrome M96.1    Relevant Medications    gabapentin (Neurontin) 600 mg tablet    oxyCODONE-acetaminophen (Percocet)  mg tablet    Lumbar radiculopathy M54.16    Relevant Medications    oxyCODONE-acetaminophen (Percocet)  mg tablet    Other Relevant Orders    Epidural Steroid Injection    Peripheral neuropathy G62.9    Relevant Medications    gabapentin (Neurontin) 600 mg tablet    Long term prescription opiate use - Primary Z79.891    Relevant Medications    naloxone (Narcan) 4 mg/0.1 mL nasal spray    Other Relevant Orders    Buprenorphine Confirm,Urine    Tapentadol Confirmation, Urine    Alcohol, Urine    Opiate/Opioid/Benzo Prescription Compliance    OOB Internal Tracking    Lumbar stenosis with neurogenic claudication M48.062     48-year-old female with history of cervical postlaminectomy syndrome as well as symptoms consistent with lumbar stenosis with neurogenic claudication.  Patient previously responded well to lumbar epidural steroid injection L4-5 receiving greater than 90% relief lasting more than 5 months.  Previously completed formal physical therapy and does home exercises.  Presents at today's office visit with chronic low back pain radiating to bilateral lower extremities right greater than left as well as chronic cervical pain radiating to left upper extremity.  Slight increase in numbness and tingling to right foot as well as some increase in weakness to right lower extremity.  Otherwise, no changes in neurologic symptoms to upper or lower extremities.  Continues to manage her pain with a decreased dose of Percocet to 10 mg 3 times per day, increased dose of gabapentin 600 mg 3 times daily and the  addition of Robaxin for muscle tightness and spasm.  Continues to endorse greater than 95% relief with this pain regimen.  She would like to repeat her lumbar epidural steroid injection at this time as she received significant relief with her last injection.  We also discussed that as she receives greater relief with the increased dose of gabapentin and the addition of Robaxin as well as repeating injections we would like to continue to wean her opiate dose to the lowest dose possible.  We will decrease her Percocet to 10 mg twice daily as needed for pain.  We will evaluate the patient for further weaning based on results of the above-noted therapy.  Plan reviewed with patient at today's office visit.    -Patient scheduled for interlaminar lumbar epidural steroid injection at L4-5 under fluoroscopic guidance.  Risks and benefits reviewed with patient and she has agreed to proceed.  CPT 05626.  -We will continue to wean the patient's Percocet dose as she gains benefit from injections and increase in neuromodulator as well as muscle relaxant.  We will decrease Percocet to 10 mg 2 times per day.  Further weaning may include a decrease to Percocet 7.5 mg 2 times daily.  Further discussion about weaning at the patient's next office visit.  -Continue gabapentin 600 mg 3 times daily.  Currently tolerating without adverse effects.  The patient was counseled on the risks and potential side effects of gabapentin as well as its importance for dosage titration. Side effects included but were not limited to, drowsiness, sedation, cognitive decline, peripheral edema, weight gain, seizures, with abrupt withdrawal.  Patient was instructed to call the office with any concerns or side effects before abruptly stopping medication.   -Robaxin 500 mg up to 3 times per day as needed for muscle tightness and spasm.  Currently tolerating without adverse effects.  -Advised patient to continue home exercises on a consistent basis targeting  cervical and lumbar pain.  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  -Follow up in 4 weeks after epidural steroid injection.        MEDICAL DECISION MAKING:    My impressions and treatment recommendations were discussed in detail with the patient, who verbalized understanding and had no further questions prior to discharge. Given the patient's report of reduced pain and improved functional ability without adverse effects,  we will continue to wean opiates.  It is reasonable to Percocet 10 mg to 2 times per day as needed for 30 days.  The terms of the opioid agreement as well as the potential risks and adverse effects of the patient's medication regimen were discussed in detail. This includes if applicable due to dosage of medication permission to discuss and coordinate care with other treatment providers relevant to the patients condition. The patient verbalized understanding.    Treatment goals were discussed in detail with the patient.  These goals include reduction of pain levels, improved levels of functioning, avoidance of medication side effect and lowest medication dose possible to achieve  these goals.  The patient was in full agreement with these goals.  Also discussed is the understanding that pain may not be eliminated by medication but that the goal of a better sustaining life through use of medication is appropriate.  Lifestyle modifications including weight management, stretching, diet, exercise and smoking are addressed at each office visit.  The patient provided a urine drug screen for routine monitoring and compliance.  This test may be given as frequently as every month based on the patient's individual opiate risk stratification and prescriber concerns for any aberrancies.  This test is indicated given the use of controlled substances for the patient's medical condition.  Unless otherwise noted the prior (s) urine  drug testing results were consistent with prescribed medications.  There is no evidence of illicit drug use or additional medications ingested.     Risks and side effects of chronic opioid therapy including but not limited to tolerance, dependence, constipation, hyperalgesia, cognitive side effects, addiction and possible death due to overuse and or misuse were discussed. I also discussed that such medications when co-administered with other sedative agents including but not limited to alcohol, benzodiazepines, sedative hypnotics and illegal drugs could pose life threatening consequences including death.  I also explained the impact that the administration of such medication has on a patient with obstructive sleep apnea and continued recommendations for use of apnea devices if ordered are prescribed by other physicians.  In order to effectively and safely treat the pain, I also emphasized the importance of compliance with the treatment plan as well as compliance with the terms of the opioid agreement which was reviewed in detail. I explained the importance of being responsible with the medications and to take these only as prescribed, never in excess and never for reasons other than pain reduction. The patient was counseled on keeping the medications safe and locked away from children and other adults as well as disposal methods and options. The patient understood the risks and instructions.      I also discussed with the patient in detail that based on the clinical response to the opioid medications and improvements of activities of daily living, sleep, and work performance in light of compliance with the treatment plan we can continue this form of therapy for the above chronic  pain.  The goal and rationale used for current treatment with chronic opioid medication is to control the pain and alleviate disability induced by the chronic pain condition noted above after failures of other non-opioid and  nonpharmacological modalities to treat the chronic pain and the symptoms associated with have failed.  The patient understood the goals in terms of the above treatment plan and had no further questions prior to leaving the office today.    Given the patient's total MED, general use of daily opiates, or other coadministered medications in various classes the patient was offered a prescription for Narcan.  I instructed the patient that Narcan is for emergency use only and is worse suspected or known opiate overdose.  Call 911 prior to administration of this medication to activate the emergency response team.  It is imperative to fill this medication in order to demonstrate understanding of the gravity of possible side effects including respiratory depression and risk of overdose of this opiate load or medication combination.  As such patients will be required to bring Narcan prescriptions to follow-up appointments as part of the compliance with continued opiate care.    Disclaimer: This note was transcribed using an audio transcription device.  As such, minor errors may be present with regard to spelling, punctuation, and inadvertent word insertion.  Please disregard such errors.             Relevant Medications    gabapentin (Neurontin) 600 mg tablet    oxyCODONE-acetaminophen (Percocet)  mg tablet     Other Visit Diagnoses         Codes    Muscle spasm     M62.838    Relevant Medications    methocarbamol (Robaxin) 500 mg tablet

## 2024-02-04 LAB
BUPRENORPHINE UR-MCNC: <2 NG/ML
BUPRENORPHINE UR-MCNC: <5 NG/ML
NALOXONE UR CFM-MCNC: <100 NG/ML
NORBUPRENORPHINE UR CFM-MCNC: <5 NG/ML
NORBUPRENORPHINE UR-MCNC: <2 NG/ML

## 2024-02-06 LAB
1OH-MIDAZOLAM UR CFM-MCNC: <25 NG/ML
6MAM UR CFM-MCNC: <25 NG/ML
7AMINOCLONAZEPAM UR CFM-MCNC: <25 NG/ML
A-OH ALPRAZ UR CFM-MCNC: <25 NG/ML
ALPRAZ UR CFM-MCNC: <25 NG/ML
CHLORDIAZEP UR CFM-MCNC: <25 NG/ML
CLONAZEPAM UR CFM-MCNC: <25 NG/ML
CODEINE UR CFM-MCNC: <50 NG/ML
DIAZEPAM UR CFM-MCNC: <25 NG/ML
EDDP UR CFM-MCNC: <25 NG/ML
FENTANYL UR CFM-MCNC: <2.5 NG/ML
HYDROCODONE CTO UR CFM-MCNC: <25 NG/ML
HYDROMORPHONE UR CFM-MCNC: <25 NG/ML
LORAZEPAM UR CFM-MCNC: <25 NG/ML
METHADONE UR CFM-MCNC: <25 NG/ML
MIDAZOLAM UR CFM-MCNC: <25 NG/ML
MORPHINE UR CFM-MCNC: <50 NG/ML
NORDIAZEPAM UR CFM-MCNC: <25 NG/ML
NORFENTANYL UR CFM-MCNC: <2.5 NG/ML
NORHYDROCODONE UR CFM-MCNC: <25 NG/ML
NOROXYCODONE UR CFM-MCNC: <25 NG/ML
NORTAPENTADOL UR CFM-MCNC: <25 NG/ML
NORTRAMADOL UR-MCNC: <50 NG/ML
OXAZEPAM UR CFM-MCNC: <25 NG/ML
OXYCODONE UR CFM-MCNC: <25 NG/ML
OXYMORPHONE UR CFM-MCNC: <25 NG/ML
TAPENTADOL UR CFM-MCNC: <25 NG/ML
TEMAZEPAM UR CFM-MCNC: <25 NG/ML
TRAMADOL UR CFM-MCNC: <50 NG/ML
ZOLPIDEM UR CFM-MCNC: <25 NG/ML
ZOLPIDEM UR-MCNC: <25 NG/ML

## 2024-02-27 DIAGNOSIS — M48.062 LUMBAR STENOSIS WITH NEUROGENIC CLAUDICATION: ICD-10-CM

## 2024-02-27 DIAGNOSIS — M96.1 CERVICAL POSTLAMINECTOMY SYNDROME: ICD-10-CM

## 2024-02-27 DIAGNOSIS — M54.16 LUMBAR RADICULOPATHY: ICD-10-CM

## 2024-02-27 NOTE — TELEPHONE ENCOUNTER
Pt is requesting refill of   Percocet  mg 1 tablet BID [place] [facility]                                                        LV: 1/31/24                   NV:  4/2/24                OARRS reviewed with L[facility]:  1/31/24  #60/30 days                          Pended RX to SHARA Chand for transmission to pharmacy.

## 2024-02-28 RX ORDER — OXYCODONE AND ACETAMINOPHEN 10; 325 MG/1; MG/1
1 TABLET ORAL 2 TIMES DAILY PRN
Qty: 60 TABLET | Refills: 0 | Status: SHIPPED | OUTPATIENT
Start: 2024-03-01 | End: 2024-04-01 | Stop reason: SDUPTHER

## 2024-03-08 ENCOUNTER — HOSPITAL ENCOUNTER (OUTPATIENT)
Dept: GASTROENTEROLOGY | Facility: HOSPITAL | Age: 49
Discharge: HOME | End: 2024-03-08
Payer: MEDICARE

## 2024-03-08 ENCOUNTER — HOSPITAL ENCOUNTER (OUTPATIENT)
Dept: RADIOLOGY | Facility: HOSPITAL | Age: 49
Discharge: HOME | End: 2024-03-08
Payer: MEDICARE

## 2024-03-08 VITALS
RESPIRATION RATE: 16 BRPM | DIASTOLIC BLOOD PRESSURE: 79 MMHG | TEMPERATURE: 97.6 F | SYSTOLIC BLOOD PRESSURE: 124 MMHG | OXYGEN SATURATION: 98 % | HEART RATE: 80 BPM

## 2024-03-08 DIAGNOSIS — R52 PAIN: ICD-10-CM

## 2024-03-08 DIAGNOSIS — M54.16 LUMBAR RADICULOPATHY: ICD-10-CM

## 2024-03-08 PROCEDURE — 2500000005 HC RX 250 GENERAL PHARMACY W/O HCPCS: Performed by: PHYSICAL MEDICINE & REHABILITATION

## 2024-03-08 PROCEDURE — 2550000001 HC RX 255 CONTRASTS: Performed by: PHYSICAL MEDICINE & REHABILITATION

## 2024-03-08 PROCEDURE — 2500000004 HC RX 250 GENERAL PHARMACY W/ HCPCS (ALT 636 FOR OP/ED): Performed by: PHYSICAL MEDICINE & REHABILITATION

## 2024-03-08 PROCEDURE — 62323 NJX INTERLAMINAR LMBR/SAC: CPT | Performed by: PHYSICAL MEDICINE & REHABILITATION

## 2024-03-08 RX ORDER — METHYLPREDNISOLONE ACETATE 40 MG/ML
INJECTION, SUSPENSION INTRA-ARTICULAR; INTRALESIONAL; INTRAMUSCULAR; SOFT TISSUE AS NEEDED
Status: COMPLETED | OUTPATIENT
Start: 2024-03-08 | End: 2024-03-08

## 2024-03-08 RX ORDER — LIDOCAINE HYDROCHLORIDE 5 MG/ML
INJECTION, SOLUTION INFILTRATION; INTRAVENOUS AS NEEDED
Status: COMPLETED | OUTPATIENT
Start: 2024-03-08 | End: 2024-03-08

## 2024-03-08 RX ADMIN — IOHEXOL 5 ML: 350 INJECTION, SOLUTION INTRAVENOUS at 10:21

## 2024-03-08 RX ADMIN — METHYLPREDNISOLONE ACETATE 40 MG: 40 INJECTION, SUSPENSION INTRA-ARTICULAR; INTRALESIONAL; INTRAMUSCULAR; SOFT TISSUE at 10:22

## 2024-03-08 RX ADMIN — LIDOCAINE HYDROCHLORIDE 15 ML: 5 INJECTION, SOLUTION INFILTRATION at 10:19

## 2024-03-08 ASSESSMENT — PAIN - FUNCTIONAL ASSESSMENT
PAIN_FUNCTIONAL_ASSESSMENT: 0-10
PAIN_FUNCTIONAL_ASSESSMENT: 0-10

## 2024-03-08 ASSESSMENT — PAIN SCALES - GENERAL
PAINLEVEL_OUTOF10: 0 - NO PAIN
PAINLEVEL_OUTOF10: 7

## 2024-03-08 ASSESSMENT — COLUMBIA-SUICIDE SEVERITY RATING SCALE - C-SSRS
1. IN THE PAST MONTH, HAVE YOU WISHED YOU WERE DEAD OR WISHED YOU COULD GO TO SLEEP AND NOT WAKE UP?: NO
6. HAVE YOU EVER DONE ANYTHING, STARTED TO DO ANYTHING, OR PREPARED TO DO ANYTHING TO END YOUR LIFE?: NO
2. HAVE YOU ACTUALLY HAD ANY THOUGHTS OF KILLING YOURSELF?: NO

## 2024-03-08 NOTE — DISCHARGE INSTRUCTIONS
You had a pain management procedure today.    Observe/ monitor for the following signs & symptoms:  If you notice Excessive bleeding (slow general oozing that completely soaks the dressing, or fresh bright red bleeding).   In either case, apply pressure to the area, elevate it if possible & call your doctor at once.    Also observe for:  Change in color  Numbness/tingling  Coldness to the touch  Swelling  Drainage  Temperature of 101.5 or higher.  Increased, uncontrollable pain.    *If you notice the above signs & symptoms, please call your doctor right away!*      Discharge Instructions:    Your pain may not be gone immediately after this procedure; it generally takes 3 to 5 days for the steroid to work.   Keep the needle site clean & dry for 24 hours.  Continue your present medications.  Make an appointment to see your doctor in 2-3 weeks.  If any problems occur, or if you have any further questions, please call as soon as possible. If you find that you cannot reach your doctor, but feel that the condition nees a doctor's attention, go to the closest emergency department & take this discharge paper with you.       Dr. Mondragon's Office: [phone]

## 2024-03-08 NOTE — PROCEDURES
General    Date/Time: 3/8/2024 10:22 AM    Performed by: Donell Mondragon MD  Authorized by: Donell Mondragon MD    Consent:     Consent obtained:  Written    Consent given by:  Patient    Risks, benefits, and alternatives were discussed: yes      Risks discussed:  Bleeding, infection, pain and nerve damage    Alternatives discussed:  No treatment, delayed treatment and alternative treatment  Universal protocol:     Procedure explained and questions answered to patient or proxy's satisfaction: yes      Relevant documents present and verified: yes      Test results available: yes      Imaging studies available: yes      Required blood products, implants, devices, and special equipment available: yes      Site/side marked: yes      Immediately prior to procedure, a time out was called: yes      Patient identity confirmed:  Verbally with patient, hospital-assigned identification number and arm band  Procedure specific details:      Lumbar interlaminar ALINE     After informed consent was obtained, the patient was brought to the operating room and placed in the prone position.  The back area was prepped and draped in usual sterile fashion.  Using fluoroscopic guidance, the skin and subcutaneous tissue overlying needle trajectory to the below interlaminar epidural space were anesthetized with a total of 5 mL of 0.5% lidocaine in the below paraspinous approach.  An 18-gauge Tuohy needle was then advanced under fluoroscopic guidance with the below paraspinous approach into the below  interlaminar epidural space. Entry of the epidural space was confirmed using loss of resistance technique with a glass syringe and 2 mL of air.  Injection of Iohexol contrast revealed appropriate spread without vascular uptake. Subsequently, the below medications were injected.  The needle was removed.  The patient tolerated the procedure well.  The patient was then transferred to recovery room in stable condition. The patient will participate  in physical therapy, update us on his/her response, follow up with us on outpatient basis as needed.    Level: [L4-5]  Medications [4 mL of 0.5% lidocaine and 40 mg methylprednisolone]

## 2024-03-08 NOTE — H&P
Patient ID: Patient with lumbar stenosis with neurogenic claudication who presents for the scheduled procedure.  No changes since last visit      Patient denies any recent antibiotic use or infections, denies any blood thinner use, and denies contrast or local anesthetic allergies           GENERAL EXAM  Vital Signs: Vital signs to include heart rate, respiration rate, blood pressure, and temperature were reviewed.  General Appearance:  Awake, alert, healthy appearing, well developed, No acute distress.  Head: Normocephalic without evidence of head injury.  Neck: The appearance of the neck was normal without swelling with a midline trachea.  Eyes: The eyelids and eyebrows exhibited no abnormalities.  Pupils were not pin-point.  Sclera was without icterus.  Lungs: Respiration rhythm and depth was normal.  Respiratory movements were normal without labored breathing.  Cardiovascular: No peripheral edema was present.    Neurological: Patient was oriented to time, place, and person.  Speech was normal.  Balance, gait, and stance were unremarkable.    Psychiatric: Appearance was normal with appropriate dress.  Mood was euthymic and affect was normal.  Skin: Affected regions were without ecchymosis or skin lesions.        Physical exam as above except:        Assessment/Plan    Patient with lumbar stenosis with neurogenic claudication here for lumbar epidural steroid injection

## 2024-04-01 ENCOUNTER — OFFICE VISIT (OUTPATIENT)
Dept: PAIN MEDICINE | Facility: HOSPITAL | Age: 49
End: 2024-04-01
Payer: MEDICARE

## 2024-04-01 VITALS
HEIGHT: 68 IN | RESPIRATION RATE: 16 BRPM | OXYGEN SATURATION: 97 % | SYSTOLIC BLOOD PRESSURE: 104 MMHG | BODY MASS INDEX: 32.98 KG/M2 | DIASTOLIC BLOOD PRESSURE: 73 MMHG | HEART RATE: 88 BPM

## 2024-04-01 DIAGNOSIS — G62.89 OTHER POLYNEUROPATHY: ICD-10-CM

## 2024-04-01 DIAGNOSIS — M48.062 LUMBAR STENOSIS WITH NEUROGENIC CLAUDICATION: ICD-10-CM

## 2024-04-01 DIAGNOSIS — M62.838 MUSCLE SPASM: ICD-10-CM

## 2024-04-01 DIAGNOSIS — M96.1 CERVICAL POSTLAMINECTOMY SYNDROME: ICD-10-CM

## 2024-04-01 DIAGNOSIS — M54.16 LUMBAR RADICULOPATHY: ICD-10-CM

## 2024-04-01 PROCEDURE — 99214 OFFICE O/P EST MOD 30 MIN: CPT | Performed by: CLINICAL NURSE SPECIALIST

## 2024-04-01 PROCEDURE — 1036F TOBACCO NON-USER: CPT | Performed by: CLINICAL NURSE SPECIALIST

## 2024-04-01 RX ORDER — OXYCODONE AND ACETAMINOPHEN 10; 325 MG/1; MG/1
1 TABLET ORAL 2 TIMES DAILY PRN
Qty: 60 TABLET | Refills: 0 | Status: SHIPPED | OUTPATIENT
Start: 2024-04-01 | End: 2024-04-02 | Stop reason: SDUPTHER

## 2024-04-01 RX ORDER — GABAPENTIN 600 MG/1
600 TABLET ORAL 3 TIMES DAILY
Qty: 90 TABLET | Refills: 2 | Status: SHIPPED | OUTPATIENT
Start: 2024-04-01 | End: 2024-04-02 | Stop reason: SDUPTHER

## 2024-04-01 RX ORDER — SEMAGLUTIDE 1.34 MG/ML
INJECTION, SOLUTION SUBCUTANEOUS
COMMUNITY

## 2024-04-01 RX ORDER — METHOCARBAMOL 500 MG/1
500 TABLET, FILM COATED ORAL 3 TIMES DAILY PRN
Qty: 90 TABLET | Refills: 2 | Status: SHIPPED | OUTPATIENT
Start: 2024-04-01 | End: 2024-04-02 | Stop reason: SDUPTHER

## 2024-04-01 ASSESSMENT — PATIENT HEALTH QUESTIONNAIRE - PHQ9
SUM OF ALL RESPONSES TO PHQ9 QUESTIONS 1 AND 2: 0
2. FEELING DOWN, DEPRESSED OR HOPELESS: NOT AT ALL
1. LITTLE INTEREST OR PLEASURE IN DOING THINGS: NOT AT ALL

## 2024-04-01 ASSESSMENT — ENCOUNTER SYMPTOMS
OCCASIONAL FEELINGS OF UNSTEADINESS: 1
DEPRESSION: 0
LOSS OF SENSATION IN FEET: 1

## 2024-04-01 NOTE — ASSESSMENT & PLAN NOTE
49-year-old female with history of cervical postlaminectomy syndrome as well as lumbar symptoms consistent with lumbar stenosis with neurogenic claudication.  Patient has done well with lumbar epidural steroid injection L4-5 receiving sustained relief.  Previously completed formal physical therapy and does home exercises.  Presents at today's office visit with chronic low back pain radiating to bilateral lower extremities right greater than left as well as chronic cervical pain radiating to left upper extremity.  No changes in numbness and tingling to left upper extremity or right lower extremity.  No change in weakness.  Denies any issues with bowel/bladder function or balance.  Continue to wean opiate to lowest dose possible.  Currently managing with a decreased dose of Percocet to 10 mg 2 times per day day as needed.  Tolerating in increased dose of gabapentin 600 mg 3 times daily and the addition of Robaxin for muscle tightness and spasm.  Repeated interlaminar lumbar epidural steroid injection at L4-5 which is currently providing greater than 65% relief of lumbar radicular symptoms.  Patient states she is able to stand longer with less pain and she is able to maintain daily activity with less discomfort after her injection.  She continues to endorse greater than 95% relief with injection and medication therapy. Plan reviewed with patient at today's office visit.    -Continue a decreased dose of Percocet 10 mg up to 2 times per day as needed for pain.  Patient feels that the medication continues to provide significant relief without adverse effects.  We will continue at this dose for now.  If she fails to receive relief with this medication we may discuss further weaning to prevent tolerance and hyperalgesia.  -Continue gabapentin 600 mg 3 times daily.  The patient was counseled on the risks and potential side effects of gabapentin as well as its importance for dosage titration. Side effects included but were  not limited to, drowsiness, sedation, cognitive decline, peripheral edema, weight gain, seizures, with abrupt withdrawal.  Patient was instructed to call the office with any concerns or side effects before abruptly stopping medication.   -Continue Robaxin 3 times per day as needed for muscle tightness and spasm.  -Advised patient that she may repeat her lumbar epidural steroid injection every 3 to 4 months as needed.  She currently is endorsing significant relief with her most recent injection.  -Advised her to continue home stretches and exercises consistently.  -Patient will follow-up in 3 months or sooner if needed.    MEDICAL DECISION MAKING:    My impressions and treatment recommendations were discussed in detail with the patient, who verbalized understanding and had no further questions prior to discharge. Given the patient's report of reduced pain and improved functional ability without adverse effects,  it is reasonable to continue her reduced dose of oxycodone 10 mg up to 2 times per day as needed for pain.  The terms of the opioid agreement as well as the potential risks and adverse effects of the patient's medication regimen were discussed in detail. This includes if applicable due to dosage of medication permission to discuss and coordinate care with other treatment providers relevant to the patients condition. The patient verbalized understanding.    Treatment goals were discussed in detail with the patient.  These goals include reduction of pain levels, improved levels of functioning, avoidance of medication side effect and lowest medication dose possible to achieve  these goals.  The patient was in full agreement with these goals.  Also discussed is the understanding that pain may not be eliminated by medication but that the goal of a better sustaining life through use of medication is appropriate.  Lifestyle modifications including weight management, stretching, diet, exercise and smoking are  addressed at each office visit.  The patient provided a urine drug screen for routine monitoring and compliance.  This test may be given as frequently as every month based on the patient's individual opiate risk stratification and prescriber concerns for any aberrancies.  This test is indicated given the use of controlled substances for the patient's medical condition.  Unless otherwise noted the prior (s) urine drug testing results were consistent with prescribed medications.  There is no evidence of illicit drug use or additional medications ingested.     Risks and side effects of chronic opioid therapy including but not limited to tolerance, dependence, constipation, hyperalgesia, cognitive side effects, addiction and possible death due to overuse and or misuse were discussed. I also discussed that such medications when co-administered with other sedative agents including but not limited to alcohol, benzodiazepines, sedative hypnotics and illegal drugs could pose life threatening consequences including death.  I also explained the impact that the administration of such medication has on a patient with obstructive sleep apnea and continued recommendations for use of apnea devices if ordered are prescribed by other physicians.  In order to effectively and safely treat the pain, I also emphasized the importance of compliance with the treatment plan as well as compliance with the terms of the opioid agreement which was reviewed in detail. I explained the importance of being responsible with the medications and to take these only as prescribed, never in excess and never for reasons other than pain reduction. The patient was counseled on keeping the medications safe and locked away from children and other adults as well as disposal methods and options. The patient understood the risks and instructions.      I also discussed with the patient in detail that based on the clinical response to the opioid medications and  improvements of activities of daily living, sleep, and work performance in light of compliance with the treatment plan we can continue this form of therapy for the above chronic  pain.  The goal and rationale used for current treatment with chronic opioid medication is to control the pain and alleviate disability induced by the chronic pain condition noted above after failures of other non-opioid and nonpharmacological modalities to treat the chronic pain and the symptoms associated with have failed.  The patient understood the goals in terms of the above treatment plan and had no further questions prior to leaving the office today.    Given the patient's total MED, general use of daily opiates, or other coadministered medications in various classes the patient was offered a prescription for Narcan.  I instructed the patient that Narcan is for emergency use only and is worse suspected or known opiate overdose.  Call 911 prior to administration of this medication to activate the emergency response team.  It is imperative to fill this medication in order to demonstrate understanding of the gravity of possible side effects including respiratory depression and risk of overdose of this opiate load or medication combination.  As such patients will be required to bring Narcan prescriptions to follow-up appointments as part of the compliance with continued opiate care.    Disclaimer: This note was transcribed using an audio transcription device.  As such, minor errors may be present with regard to spelling, punctuation, and inadvertent word insertion.  Please disregard such errors.

## 2024-04-01 NOTE — PROGRESS NOTES
Subjective   Patient ID: Breanne Obregon is a 49 y.o. female who presents for cervical radicular pain and lumbar radicular pain    HPI    49-year-old female with history of lumbar central canal stenosis resulting in lumbar radicular pain as well as cervical pain syndrome.  She is followed by surgeon at the [facility] for cervical postlaminectomy syndrome and currently feels that her symptoms remain stable.  Her lumbar radicular symptoms remain most bothersome.  Lumbar MRI consistent with multilevel degenerative changes with varying degrees of central canal stenosis.  She continues to respond well to lumbar epidural steroid injections at L4-5 which have provided sustained relief.  Scheduled to repeat her injection at her last office visit.  She presents at today's office visit with chronic cervical pain which radiates most often to her left upper extremity.  She does have chronic numbness and tingling to her left hand which is unchanged from previous exam.  She denies any weakness to her upper extremities.  She has chronic low back pain radiating into bilateral hips and lower extremities into her thighs.  She has chronic numbness and tingling to bilateral feet right greater than left.  She has chronic weakness in her lower extremities which she states is unchanged from prior exam.  She does feel that she has noted improvement in pain with her most recent lumbar epidural steroid injection.  Most recent lumbar epidural steroid injection on 3/8/2024 is providing greater than 65% relief of lumbar radicular symptoms.  Has noted improvement in radicular symptoms.  She is able to stand longer with less pain and be more active.  Also managing her pain with a decreased dose of Percocet 10 mg which she takes twice per day, gabapentin 600 mg 3 times per day and Robaxin when needed for muscle tightness and spasm.  She feels that between injections and her medication regimen her pain is tolerable and she is able to maintain  "her normal activity.  She also is continuing home therapy exercises which she feels are beneficial.    location of Pain:  Patient returns to the office for re-evaluation of \"chronic lower back pain that radiates down the lateral and posterior side of bilateral legs stopping at the knee.mpt states she also has neck pain, described as spasms that radiates down the left arm into the hand causing pain and numbness. Three toes amputated causing foot pain. Patient presents today for follow up post L4-L5 ALINE on 03/08/2024 and reports 65% relief     Pain Score: 8/10     Treatment:   Percocet , TID  Last dose: 03/31/2024  Medication Count: 0  Fill date: 03/01/2024      Efficacy: 95% relief for 8 hours     Side Effects: none     Narcan: 09/2025     Other pain medication/neuromodulator: gabapentin- needs refill/Robaxin-needs refill      Injections and/or Procedures: LESI L4-L5 03/08/2024 with 65% re;ief        OARRS:  No data recorded  I have personally reviewed the OARRS report for Breanne Obregon. I have considered the risks of abuse, dependence, addiction and diversion    Is the patient prescribed a combination of a benzodiazepine and opioid?  No    Last Urine Drug Screen / ordered today: No 01/31/2024  Recent Results (from the past 8760 hour(s))   Confirmation Opiate/Opioid/Benzo Prescription Compliance    Collection Time: 01/31/24  2:16 PM   Result Value Ref Range    Clonazepam <25 <25 ng/mL    7-Aminoclonazepam <25 <25 ng/mL    Alprazolam <25 <25 ng/mL    Alpha-Hydroxyalprazolam <25 <25 ng/mL    Midazolam <25 <25 ng/mL    Alpha-Hydroxymidazolam <25 <25 ng/mL    Chlordiazepoxide <25 <25 ng/mL    Diazepam <25 <25 ng/mL    Nordiazepam <25 <25 ng/mL    Temazepam <25 <25 ng/mL    Oxazepam <25 <25 ng/mL    Lorazepam <25 <25 ng/mL    Methadone <25 <25 ng/mL    EDDP <25 <25 ng/mL    6-Acetylmorphine <25 <25 ng/mL    Codeine <50 <50 ng/mL    Hydrocodone <25 <25 ng/mL    Hydromorphone <25 <25 ng/mL    Morphine  <50 <50 ng/mL "    Norhydrocodone <25 <25 ng/mL    Noroxycodone <25 <25 ng/mL    Oxycodone <25 <25 ng/mL    Oxymorphone <25 <25 ng/mL    Fentanyl <2.5 <2.5 ng/mL    Norfentanyl <2.5 <2.5 ng/mL    Tramadol <50 <50 ng/mL    O-Desmethyltramadol <50 <50 ng/mL    Zolpidem <25 <25 ng/mL    Zolpidem Metabolite (ZCA) <25 <25 ng/mL   Screen Opiate/Opioid/Benzo Prescription Compliance    Collection Time: 01/31/24  2:16 PM   Result Value Ref Range    Creatinine, Urine Random 152.5 20.0 - 320.0 mg/dL    Amphetamine Screen, Urine Presumptive Negative Presumptive Negative    Barbiturate Screen, Urine Presumptive Negative Presumptive Negative    Cannabinoid Screen, Urine Presumptive Negative Presumptive Negative    Cocaine Metabolite Screen, Urine Presumptive Negative Presumptive Negative    PCP Screen, Urine Presumptive Negative Presumptive Negative   Tapentadol Confirmation, Urine    Collection Time: 01/31/24  2:16 PM   Result Value Ref Range    Tapentadol <25 <25 ng/mL    N-Desmethyltapentadol <25 <25 ng/mL   Buprenorphine Confirm,Urine    Collection Time: 01/31/24  2:16 PM   Result Value Ref Range    BUPRENORPHINE GLUC, URINE <5 ng/mL    BUPRENORPHINE ,URINE <2 ng/mL    NALOXONE, URINE <100 ng/mL    NORBUPRENORPHINE GLUC,URINE <5 ng/mL    NORBUPRENORPHINE, URINE <2 ng/mL     Results are as expected.     Controlled Substance Agreement:  Date of the Last Agreement:  10/17/2023  Reviewed Controlled Substance Agreement including but not limited to the benefits, risks, and alternatives to treatment with a Controlled Substance medication(s).    Monitoring and compliance:    ORT: 10/17/2023    PDUQ: 01/31/2024    Office Agreement: 07/13/2023      Review of Systems    ROS:   General: No fevers, chills, weight loss  Skin: Negative for lesions  Eyes: No acute vision changes  Ears: No vertigo  Nose, mouth, throat: No difficulty swallowing or speaking  Respiratory: No cough, shortness of breath, cyanosis  Cardiovascular: Negative for chest pain syncope  or palpitation  Gastrointestinal: No constipation, nausea, vomiting  Neurological: Negative for headache, positive for: Paresthesia and intermittent weakness  Psychological: Negative for severe or debilitating anxiety, depression. Negative memory loss  Musculoskeletal: Positive for arthralgia, myalgia, pain and spasm  Endocrine: Negative for weight gain, appetite changes, excessive sweating  Allergy/immune: Negative    All 13 systems were reviewed and are within normal levels except as noted or in the history of present illness.  Positive or pertinent negative responses are noted or were in the history of present illness. As noted, the patient denies significant or impairing weakness in the bilateral upper and lower extremities, medication induced constipation, and bowel or bladder incontinence.     Current Outpatient Medications:     Accu-Chek Guide test strips strip, TEST 3 TIMES A DAY AS DIRECTED, Disp: , Rfl:     alcohol swabs (BD Alcohol Swabs) pads, medicated, Apply topically., Disp: , Rfl:     ARIPiprazole (Abilify) 10 mg tablet, Take 1 tablet (10 mg) by mouth once daily., Disp: , Rfl:     atorvastatin (Lipitor) 10 mg tablet, Take 1 tablet (10 mg) by mouth once daily at bedtime., Disp: , Rfl:     b complex vitamins (Vitamins B Complex) capsule, Vitamin B Complex CAPS  Refills: 0     Active, Disp: , Rfl:     ferrous gluconate 324 (38 Fe) MG tablet, Take 1 tablet (324 mg) by mouth once daily., Disp: , Rfl:     gabapentin (Neurontin) 600 mg tablet, Take 1 tablet (600 mg) by mouth 3 times a day., Disp: 90 tablet, Rfl: 2    glipiZIDE (Glucotrol) 10 mg tablet, Take 1 tablet (10 mg) by mouth every 12 hours., Disp: , Rfl:     lamoTRIgine (LaMICtal) 100 mg tablet, Take 1 tablet (100 mg) by mouth 2 times a day., Disp: , Rfl:     levothyroxine (Synthroid, Levoxyl) 50 mcg tablet, , Disp: , Rfl:     lidocaine (Lidoderm) 5 % patch, Place 1 patch on the skin once daily. Remove & discard patch within 12 hours or as directed  by MD., Disp: , Rfl:     metFORMIN (Glucophage) 1,000 mg tablet, Take 1 tablet (1,000 mg) by mouth 2 times a day., Disp: , Rfl:     methocarbamol (Robaxin) 500 mg tablet, Take 1 tablet (500 mg) by mouth 3 times a day as needed for muscle spasms., Disp: 90 tablet, Rfl: 2    pantoprazole (ProtoNix) 40 mg EC tablet, Take 1 tablet (40 mg) by mouth every 12 hours., Disp: , Rfl:     pyridoxine HCl, vitamin B6, (VITAMIN B-6 ORAL), Vitamin B-6 TABS  Refills: 0     Active, Disp: , Rfl:     scopolamine (Transderm-Scop) 1 mg over 3 days patch 3 day, Place 1 patch on the skin every 3rd day. For motion sickness, Disp: , Rfl:      Past Medical History:   Diagnosis Date    Personal history of other diseases of the musculoskeletal system and connective tissue     History of arthritis    Personal history of other mental and behavioral disorders     History of depression    Personal history of other mental and behavioral disorders     History of anxiety    Presence of spectacles and contact lenses     Wears glasses        Past Surgical History:   Procedure Laterality Date    OTHER SURGICAL HISTORY  2019    Gallbladder surgery    OTHER SURGICAL HISTORY  2019     section    OTHER SURGICAL HISTORY  2019    Toe amputation    OTHER SURGICAL HISTORY  2019    Thyroid surgery        Family History   Problem Relation Name Age of Onset    No Known Problems Other          Allergies   Allergen Reactions    Aspirin GI Upset    Morphine Unknown    Other Unknown     Nonsteroidal Anti-Inflammatory Agents        Objective     Visit Vitals  Smoking Status Never        Physical Exam    PE:  General: Well-developed, well-nourished, no acute distress. The patient demonstrates no pain behavior, symptom magnification or overt drug-seeking behavior.  Eye: Pupils appropriate for room lighting  Neck/thyroid: No obvious goiter or enlargement of neck noted  Respiratory exam: Normal respiratory effort, unlabored respiration. No  accessory muscle use noted  Cardiac exam: Bilateral radial pulses intact  Abdominal: Nondistended  Spine, cervical: Tenderness to paraspinous musculature paracervical region left greater than right.  Flexion and extension intact with extension increasing symptoms radiating to left shoulder.  Spine, lumbar: The patient is able to rise from a seated to standing position without hesitancy, push off, or delay. Gait is grossly nonantalgic. Tenderness to paraspinous musculature is noted lumbar spine.  Slightly forward leaning posture.  Flexion and extension intact with extension causing more symptoms to right lower extremity. Positive straight leg raise right lower extremity.  Neurologic exam: Muscle strength is antigravity in all 4 extremities.  Equal muscle strength bilateral lower extremities 5/5.  Equal muscle strength bilateral upper extremities 5/5.  Bilateral feet warm to touch with 2+ DP pulses.   Psychiatric exam: Judgment and insight normal, affect normal, speech is fluent, affect appropriate, demonstrating no signs of hypersomnolence, sedation, or confusion        Assessment/Plan   Problem List Items Addressed This Visit             ICD-10-CM    Cervical postlaminectomy syndrome M96.1    Relevant Medications    oxyCODONE-acetaminophen (Percocet)  mg tablet    gabapentin (Neurontin) 600 mg tablet    Lumbar radiculopathy M54.16    Relevant Medications    oxyCODONE-acetaminophen (Percocet)  mg tablet    Peripheral neuropathy G62.9    Relevant Medications    gabapentin (Neurontin) 600 mg tablet    Lumbar stenosis with neurogenic claudication M48.062     49-year-old female with history of cervical postlaminectomy syndrome as well as lumbar symptoms consistent with lumbar stenosis with neurogenic claudication.  Patient has done well with lumbar epidural steroid injection L4-5 receiving sustained relief.  Previously completed formal physical therapy and does home exercises.  Presents at today's office  visit with chronic low back pain radiating to bilateral lower extremities right greater than left as well as chronic cervical pain radiating to left upper extremity.  No changes in numbness and tingling to left upper extremity or right lower extremity.  No change in weakness.  Denies any issues with bowel/bladder function or balance.  Continue to wean opiate to lowest dose possible.  Currently managing with a decreased dose of Percocet to 10 mg 2 times per day day as needed.  Tolerating in increased dose of gabapentin 600 mg 3 times daily and the addition of Robaxin for muscle tightness and spasm.  Repeated interlaminar lumbar epidural steroid injection at L4-5 which is currently providing greater than 65% relief of lumbar radicular symptoms.  Patient states she is able to stand longer with less pain and she is able to maintain daily activity with less discomfort after her injection.  She continues to endorse greater than 95% relief with injection and medication therapy. Plan reviewed with patient at today's office visit.    -Continue a decreased dose of Percocet 10 mg up to 2 times per day as needed for pain.  Patient feels that the medication continues to provide significant relief without adverse effects.  We will continue at this dose for now.  If she fails to receive relief with this medication we may discuss further weaning to prevent tolerance and hyperalgesia.  -Continue gabapentin 600 mg 3 times daily.  The patient was counseled on the risks and potential side effects of gabapentin as well as its importance for dosage titration. Side effects included but were not limited to, drowsiness, sedation, cognitive decline, peripheral edema, weight gain, seizures, with abrupt withdrawal.  Patient was instructed to call the office with any concerns or side effects before abruptly stopping medication.   -Continue Robaxin 3 times per day as needed for muscle tightness and spasm.  -Advised patient that she may repeat  her lumbar epidural steroid injection every 3 to 4 months as needed.  She currently is endorsing significant relief with her most recent injection.  -Advised her to continue home stretches and exercises consistently.  -Patient will follow-up in 3 months or sooner if needed.    MEDICAL DECISION MAKING:    My impressions and treatment recommendations were discussed in detail with the patient, who verbalized understanding and had no further questions prior to discharge. Given the patient's report of reduced pain and improved functional ability without adverse effects,  it is reasonable to continue her reduced dose of oxycodone 10 mg up to 2 times per day as needed for pain.  The terms of the opioid agreement as well as the potential risks and adverse effects of the patient's medication regimen were discussed in detail. This includes if applicable due to dosage of medication permission to discuss and coordinate care with other treatment providers relevant to the patients condition. The patient verbalized understanding.    Treatment goals were discussed in detail with the patient.  These goals include reduction of pain levels, improved levels of functioning, avoidance of medication side effect and lowest medication dose possible to achieve  these goals.  The patient was in full agreement with these goals.  Also discussed is the understanding that pain may not be eliminated by medication but that the goal of a better sustaining life through use of medication is appropriate.  Lifestyle modifications including weight management, stretching, diet, exercise and smoking are addressed at each office visit.  The patient provided a urine drug screen for routine monitoring and compliance.  This test may be given as frequently as every month based on the patient's individual opiate risk stratification and prescriber concerns for any aberrancies.  This test is indicated given the use of controlled substances for the patient's  medical condition.  Unless otherwise noted the prior (s) urine drug testing results were consistent with prescribed medications.  There is no evidence of illicit drug use or additional medications ingested.     Risks and side effects of chronic opioid therapy including but not limited to tolerance, dependence, constipation, hyperalgesia, cognitive side effects, addiction and possible death due to overuse and or misuse were discussed. I also discussed that such medications when co-administered with other sedative agents including but not limited to alcohol, benzodiazepines, sedative hypnotics and illegal drugs could pose life threatening consequences including death.  I also explained the impact that the administration of such medication has on a patient with obstructive sleep apnea and continued recommendations for use of apnea devices if ordered are prescribed by other physicians.  In order to effectively and safely treat the pain, I also emphasized the importance of compliance with the treatment plan as well as compliance with the terms of the opioid agreement which was reviewed in detail. I explained the importance of being responsible with the medications and to take these only as prescribed, never in excess and never for reasons other than pain reduction. The patient was counseled on keeping the medications safe and locked away from children and other adults as well as disposal methods and options. The patient understood the risks and instructions.      I also discussed with the patient in detail that based on the clinical response to the opioid medications and improvements of activities of daily living, sleep, and work performance in light of compliance with the treatment plan we can continue this form of therapy for the above chronic  pain.  The goal and rationale used for current treatment with chronic opioid medication is to control the pain and alleviate disability induced by the chronic pain condition  noted above after failures of other non-opioid and nonpharmacological modalities to treat the chronic pain and the symptoms associated with have failed.  The patient understood the goals in terms of the above treatment plan and had no further questions prior to leaving the office today.    Given the patient's total MED, general use of daily opiates, or other coadministered medications in various classes the patient was offered a prescription for Narcan.  I instructed the patient that Narcan is for emergency use only and is worse suspected or known opiate overdose.  Call 911 prior to administration of this medication to activate the emergency response team.  It is imperative to fill this medication in order to demonstrate understanding of the gravity of possible side effects including respiratory depression and risk of overdose of this opiate load or medication combination.  As such patients will be required to bring Narcan prescriptions to follow-up appointments as part of the compliance with continued opiate care.    Disclaimer: This note was transcribed using an audio transcription device.  As such, minor errors may be present with regard to spelling, punctuation, and inadvertent word insertion.  Please disregard such errors.                Relevant Medications    oxyCODONE-acetaminophen (Percocet)  mg tablet    gabapentin (Neurontin) 600 mg tablet     Other Visit Diagnoses         Codes    Muscle spasm     M62.838    Relevant Medications    methocarbamol (Robaxin) 500 mg tablet

## 2024-04-02 ENCOUNTER — APPOINTMENT (OUTPATIENT)
Dept: PAIN MEDICINE | Facility: HOSPITAL | Age: 49
End: 2024-04-02
Payer: MEDICARE

## 2024-04-02 DIAGNOSIS — M96.1 CERVICAL POSTLAMINECTOMY SYNDROME: ICD-10-CM

## 2024-04-02 DIAGNOSIS — M62.838 MUSCLE SPASM: ICD-10-CM

## 2024-04-02 DIAGNOSIS — G62.89 OTHER POLYNEUROPATHY: ICD-10-CM

## 2024-04-02 DIAGNOSIS — M54.16 LUMBAR RADICULOPATHY: ICD-10-CM

## 2024-04-02 DIAGNOSIS — M48.062 LUMBAR STENOSIS WITH NEUROGENIC CLAUDICATION: ICD-10-CM

## 2024-04-02 RX ORDER — OXYCODONE AND ACETAMINOPHEN 10; 325 MG/1; MG/1
1 TABLET ORAL 2 TIMES DAILY PRN
Qty: 60 TABLET | Refills: 0 | Status: SHIPPED | OUTPATIENT
Start: 2024-04-02 | End: 2024-04-24 | Stop reason: SDUPTHER

## 2024-04-02 RX ORDER — METHOCARBAMOL 500 MG/1
500 TABLET, FILM COATED ORAL 3 TIMES DAILY PRN
Qty: 90 TABLET | Refills: 2 | Status: SHIPPED | OUTPATIENT
Start: 2024-04-02 | End: 2024-05-02

## 2024-04-02 RX ORDER — GABAPENTIN 600 MG/1
600 TABLET ORAL 3 TIMES DAILY
Qty: 90 TABLET | Refills: 2 | Status: SHIPPED | OUTPATIENT
Start: 2024-04-02 | End: 2024-05-02

## 2024-04-02 NOTE — TELEPHONE ENCOUNTER
OK to resend patient's prescriptions to her local pharmacy and cancel those at her mail away pharmacy.

## 2024-04-02 NOTE — TELEPHONE ENCOUNTER
Percocet, Robaxin and Gabapentin all sent to [facility] in error.  Called [facility] and spoke to Leigh GARZA Pharmacist who cancelled above RX sent to them.  Repended meds to Tiffany Iqbal CNP to be transmitted to [facility].  Notified pt..  Jackie Glover RN

## 2024-04-02 NOTE — TELEPHONE ENCOUNTER
Patient calling to advise gabapentin, Oxycodone-acetaminophen, and a muscle relaxer that was discussed at yesterdays visit 04/01/24 was not sent to pharmacy ([facility])

## 2024-04-24 DIAGNOSIS — M96.1 CERVICAL POSTLAMINECTOMY SYNDROME: ICD-10-CM

## 2024-04-24 DIAGNOSIS — M54.16 LUMBAR RADICULOPATHY: ICD-10-CM

## 2024-04-24 DIAGNOSIS — M48.062 LUMBAR STENOSIS WITH NEUROGENIC CLAUDICATION: ICD-10-CM

## 2024-04-26 RX ORDER — OXYCODONE AND ACETAMINOPHEN 10; 325 MG/1; MG/1
1 TABLET ORAL 2 TIMES DAILY PRN
Qty: 60 TABLET | Refills: 0 | Status: SHIPPED | OUTPATIENT
Start: 2024-05-02 | End: 2024-05-28 | Stop reason: SDUPTHER

## 2024-05-28 DIAGNOSIS — M96.1 CERVICAL POSTLAMINECTOMY SYNDROME: ICD-10-CM

## 2024-05-28 DIAGNOSIS — M48.062 LUMBAR STENOSIS WITH NEUROGENIC CLAUDICATION: ICD-10-CM

## 2024-05-28 DIAGNOSIS — M54.16 LUMBAR RADICULOPATHY: ICD-10-CM

## 2024-05-28 NOTE — TELEPHONE ENCOUNTER
Pt is requesting refill of  Percocet  mg 1 tablet po BID [place]                                                         LV:   4/1/24                   NV:  6/24/24                 OARRS reviewed with [facility]:  5/2/24  #60/30 days                          Pended RX to SHARA Chand for transmission to pharmacy.

## 2024-05-29 RX ORDER — OXYCODONE AND ACETAMINOPHEN 10; 325 MG/1; MG/1
1 TABLET ORAL 2 TIMES DAILY PRN
Qty: 60 TABLET | Refills: 0 | Status: SHIPPED | OUTPATIENT
Start: 2024-06-01 | End: 2024-07-01

## 2024-05-29 NOTE — TELEPHONE ENCOUNTER
Okay to refill Percocet 10 mg up to 2 times per day as needed for pain #60 for 30 days with start date 6/1/2024.

## 2024-06-24 ENCOUNTER — APPOINTMENT (OUTPATIENT)
Dept: PAIN MEDICINE | Facility: HOSPITAL | Age: 49
End: 2024-06-24
Payer: MEDICARE

## 2024-06-27 ENCOUNTER — OFFICE VISIT (OUTPATIENT)
Dept: PAIN MEDICINE | Facility: HOSPITAL | Age: 49
End: 2024-06-27
Payer: MEDICARE

## 2024-06-27 VITALS
RESPIRATION RATE: 16 BRPM | DIASTOLIC BLOOD PRESSURE: 66 MMHG | BODY MASS INDEX: 33.13 KG/M2 | HEIGHT: 68 IN | SYSTOLIC BLOOD PRESSURE: 108 MMHG | OXYGEN SATURATION: 98 % | HEART RATE: 80 BPM | WEIGHT: 218.6 LBS

## 2024-06-27 DIAGNOSIS — M54.16 LUMBAR RADICULOPATHY: ICD-10-CM

## 2024-06-27 DIAGNOSIS — Z79.891 LONG TERM PRESCRIPTION OPIATE USE: Primary | ICD-10-CM

## 2024-06-27 DIAGNOSIS — G62.89 OTHER POLYNEUROPATHY: ICD-10-CM

## 2024-06-27 DIAGNOSIS — M54.12 CERVICAL RADICULOPATHY AT C6: ICD-10-CM

## 2024-06-27 DIAGNOSIS — M62.838 MUSCLE SPASM: ICD-10-CM

## 2024-06-27 DIAGNOSIS — M96.1 CERVICAL POSTLAMINECTOMY SYNDROME: ICD-10-CM

## 2024-06-27 DIAGNOSIS — M79.18 MYOFASCIAL PAIN: ICD-10-CM

## 2024-06-27 DIAGNOSIS — M48.062 LUMBAR STENOSIS WITH NEUROGENIC CLAUDICATION: ICD-10-CM

## 2024-06-27 DIAGNOSIS — G54.2 CERVICAL NEUROPATHY: ICD-10-CM

## 2024-06-27 PROCEDURE — 80307 DRUG TEST PRSMV CHEM ANLYZR: CPT | Mod: PORLAB | Performed by: CLINICAL NURSE SPECIALIST

## 2024-06-27 PROCEDURE — 80348 DRUG SCREENING BUPRENORPHINE: CPT | Performed by: CLINICAL NURSE SPECIALIST

## 2024-06-27 PROCEDURE — 99214 OFFICE O/P EST MOD 30 MIN: CPT | Performed by: CLINICAL NURSE SPECIALIST

## 2024-06-27 PROCEDURE — 1036F TOBACCO NON-USER: CPT | Performed by: CLINICAL NURSE SPECIALIST

## 2024-06-27 RX ORDER — METHOCARBAMOL 500 MG/1
500 TABLET, FILM COATED ORAL 3 TIMES DAILY PRN
Qty: 90 TABLET | Refills: 2 | Status: SHIPPED | OUTPATIENT
Start: 2024-06-27 | End: 2024-07-27

## 2024-06-27 RX ORDER — OXYCODONE AND ACETAMINOPHEN 10; 325 MG/1; MG/1
1 TABLET ORAL 2 TIMES DAILY PRN
Qty: 60 TABLET | Refills: 0 | Status: SHIPPED | OUTPATIENT
Start: 2024-07-01 | End: 2024-07-31

## 2024-06-27 RX ORDER — GABAPENTIN 600 MG/1
600 TABLET ORAL 3 TIMES DAILY
Qty: 90 TABLET | Refills: 2 | Status: SHIPPED | OUTPATIENT
Start: 2024-06-27 | End: 2024-07-27

## 2024-06-27 ASSESSMENT — ENCOUNTER SYMPTOMS
OCCASIONAL FEELINGS OF UNSTEADINESS: 1
LOSS OF SENSATION IN FEET: 0
DEPRESSION: 0

## 2024-06-27 NOTE — PROGRESS NOTES
Subjective   Patient ID: Beranne Obregon is a 49 y.o. female who presents for cervical radicular pain and lumbar radicular pain      HPI    49-year-old female with history of lumbar central canal stenosis resulting in lumbar radicular pain as well as cervical radicular pain presents for follow-up.  She is followed by a surgeon at the [facility] for cervical postlaminectomy syndrome and continues to feel that the symptoms remain stable.  Her lumbar radicular symptoms are most bothersome.  Lumbar MRI consistent with multilevel degenerative changes with varying degrees of central canal stenosis.  She responds well to lumbar epidural steroid injections at L4-5 which provide significant/sustained relief.  Most recent lumbar epidural steroid injection from 3/8/2024 continues to provide some relief.  She would like to discuss repeating this injection in the fall when she notes her pain increases with weather changes.  Continues to manage her pain with oxycodone 10 mg up to 2 times per day as needed, gabapentin 600 mg 3 times daily and Robaxin for muscle tightness and spasm.  She presents at today's office visit with chronic low back pain which radiates into lateral and posterior lower extremities to the level of her feet.  She has chronic numbness and tingling in bilateral feet right greater than left.  Chronic weakness to her lower extremities unchanged from previous exam.  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  She has chronic cervical pain radiating into bilateral upper extremities left greater than right.  She has chronic numbness and tingling in her left hand unchanged from prior exam.  She denies any weakness in her upper extremities.  She does feel that she continues to receive some relief from her recent epidural steroid injection.  She would like to plan to repeat this injection at her next visit.  She feels that her medication regimen including  "Percocet 10 mg 2 times per day as needed, gabapentin 600 mg 3 times per day and Robaxin as needed for muscle tightness and spasms continues to provide significant relief.  She states that this pain regimen keeps her pain tolerable and allows her to continue to be active.  She continues home therapy exercises and stretches which remain beneficial.    Location of Pain:  Patient returns to the office for re-evaluation of \"chronic lower back pain that radiates down the lateral and posterior side of bilateral legs stopping at the knee. pt states she also has neck pain, described as spasms that radiates down the left arm into the hand causing pain and numbness. Three toes amputated causing foot pain.      Pain Score: 7/10     Treatment: Percocet  BID  Last dose: 6/27/24 1 dose and BID  Medication Count: 9 pills left in rx bottle  Fill date: 6/1/24      Efficacy: 95% relief for 8 hours     Side Effects: none     Narcan: 09/2025     Other pain medication/neuromodulator: Gabapentin- needs refill/Robaxin-needs refill - [facility]     Injections and/or Procedures: LESI L4-L5 03/08/2024 with 65% relief      OARRS:  No data recorded  I have personally reviewed the OARRS report for Breanne Obregon. I have considered the risks of abuse, dependence, addiction and diversion    Is the patient prescribed a combination of a benzodiazepine and opioid?  No    Last Urine Drug Screen / ordered today: No  Recent Results (from the past 8760 hour(s))   Confirmation Opiate/Opioid/Benzo Prescription Compliance    Collection Time: 01/31/24  2:16 PM   Result Value Ref Range    Clonazepam <25 <25 ng/mL    7-Aminoclonazepam <25 <25 ng/mL    Alprazolam <25 <25 ng/mL    Alpha-Hydroxyalprazolam <25 <25 ng/mL    Midazolam <25 <25 ng/mL    Alpha-Hydroxymidazolam <25 <25 ng/mL    Chlordiazepoxide <25 <25 ng/mL    Diazepam <25 <25 ng/mL    Nordiazepam <25 <25 ng/mL    Temazepam <25 <25 ng/mL    Oxazepam <25 <25 ng/mL    Lorazepam <25 <25 ng/mL "    Methadone <25 <25 ng/mL    EDDP <25 <25 ng/mL    6-Acetylmorphine <25 <25 ng/mL    Codeine <50 <50 ng/mL    Hydrocodone <25 <25 ng/mL    Hydromorphone <25 <25 ng/mL    Morphine  <50 <50 ng/mL    Norhydrocodone <25 <25 ng/mL    Noroxycodone <25 <25 ng/mL    Oxycodone <25 <25 ng/mL    Oxymorphone <25 <25 ng/mL    Fentanyl <2.5 <2.5 ng/mL    Norfentanyl <2.5 <2.5 ng/mL    Tramadol <50 <50 ng/mL    O-Desmethyltramadol <50 <50 ng/mL    Zolpidem <25 <25 ng/mL    Zolpidem Metabolite (ZCA) <25 <25 ng/mL   Screen Opiate/Opioid/Benzo Prescription Compliance    Collection Time: 01/31/24  2:16 PM   Result Value Ref Range    Creatinine, Urine Random 152.5 20.0 - 320.0 mg/dL    Amphetamine Screen, Urine Presumptive Negative Presumptive Negative    Barbiturate Screen, Urine Presumptive Negative Presumptive Negative    Cannabinoid Screen, Urine Presumptive Negative Presumptive Negative    Cocaine Metabolite Screen, Urine Presumptive Negative Presumptive Negative    PCP Screen, Urine Presumptive Negative Presumptive Negative   Tapentadol Confirmation, Urine    Collection Time: 01/31/24  2:16 PM   Result Value Ref Range    Tapentadol <25 <25 ng/mL    N-Desmethyltapentadol <25 <25 ng/mL   Buprenorphine Confirm,Urine    Collection Time: 01/31/24  2:16 PM   Result Value Ref Range    BUPRENORPHINE GLUC, URINE <5 ng/mL    BUPRENORPHINE ,URINE <2 ng/mL    NALOXONE, URINE <100 ng/mL    NORBUPRENORPHINE GLUC,URINE <5 ng/mL    NORBUPRENORPHINE, URINE <2 ng/mL     Results are as expected.     Controlled Substance Agreement:  Date of the Last Agreement: 10/17/23  Reviewed Controlled Substance Agreement including but not limited to the benefits, risks, and alternatives to treatment with a Controlled Substance medication(s).    Monitoring and compliance:    ORT:    PDUQ:    Office Agreement:      Review of Systems    ROS:   General: No fevers, chills, weight loss  Skin: Negative for lesions  Eyes: No acute vision changes  Ears: No  vertigo  Nose, mouth, throat: No difficulty swallowing or speaking  Respiratory: No cough, shortness of breath, cyanosis  Cardiovascular: Negative for chest pain syncope or palpitation  Gastrointestinal: No constipation, nausea, vomiting  Neurological: Negative for headache, positive for: Paresthesia upper and lower extremities, weakness lower extremities  Psychological: Negative for severe or debilitating anxiety, depression. Negative memory loss  Musculoskeletal: Positive for arthralgia, myalgia, pain and spasms  Endocrine: Negative for weight gain, appetite changes, excessive sweating  Allergy/immune: Negative    All 13 systems were reviewed and are within normal levels except as noted or in the history of present illness.  Positive or pertinent negative responses are noted or were in the history of present illness. As noted, the patient denies significant or impairing weakness in the bilateral upper and lower extremities, medication induced constipation, and bowel or bladder incontinence.     Current Outpatient Medications:     Accu-Chek Guide test strips strip, TEST 3 TIMES A DAY AS DIRECTED, Disp: , Rfl:     alcohol swabs (BD Alcohol Swabs) pads, medicated, Apply topically., Disp: , Rfl:     ARIPiprazole (Abilify) 10 mg tablet, Take 1 tablet (10 mg) by mouth once daily., Disp: , Rfl:     atorvastatin (Lipitor) 10 mg tablet, Take 1 tablet (10 mg) by mouth once daily at bedtime., Disp: , Rfl:     b complex vitamins (Vitamins B Complex) capsule, Vitamin B Complex CAPS  Refills: 0     Active, Disp: , Rfl:     ferrous gluconate 324 (38 Fe) MG tablet, Take 1 tablet (324 mg) by mouth once daily., Disp: , Rfl:     gabapentin (Neurontin) 600 mg tablet, Take 1 tablet (600 mg) by mouth 3 times a day., Disp: 90 tablet, Rfl: 2    glipiZIDE (Glucotrol) 10 mg tablet, Take 1 tablet (10 mg) by mouth every 12 hours., Disp: , Rfl:     lamoTRIgine (LaMICtal) 100 mg tablet, Take 1 tablet (100 mg) by mouth 2 times a day., Disp: ,  Rfl:     levothyroxine (Synthroid, Levoxyl) 50 mcg tablet, , Disp: , Rfl:     lidocaine (Lidoderm) 5 % patch, Place 1 patch on the skin once daily. Remove & discard patch within 12 hours or as directed by MD., Disp: , Rfl:     metFORMIN (Glucophage) 1,000 mg tablet, Take 1 tablet (1,000 mg) by mouth 2 times a day., Disp: , Rfl:     methocarbamol (Robaxin) 500 mg tablet, Take 1 tablet (500 mg) by mouth 3 times a day as needed for muscle spasms., Disp: 90 tablet, Rfl: 2    oxyCODONE-acetaminophen (Percocet)  mg tablet, Take 1 tablet by mouth 2 times a day as needed for severe pain (7 - 10). Do not fill before 2024., Disp: 60 tablet, Rfl: 0    pantoprazole (ProtoNix) 40 mg EC tablet, Take 1 tablet (40 mg) by mouth every 12 hours., Disp: , Rfl:     pyridoxine HCl, vitamin B6, (VITAMIN B-6 ORAL), Vitamin B-6 TABS  Refills: 0     Active, Disp: , Rfl:     scopolamine (Transderm-Scop) 1 mg over 3 days patch 3 day, Place 1 patch on the skin every 3rd day. For motion sickness, Disp: , Rfl:     semaglutide (Ozempic) 0.25 mg or 0.5 mg(2 mg/1.5 mL) pen injector, Inject under the skin 1 (one) time per week., Disp: , Rfl:      Past Medical History:   Diagnosis Date    Personal history of other diseases of the musculoskeletal system and connective tissue     History of arthritis    Personal history of other mental and behavioral disorders     History of depression    Personal history of other mental and behavioral disorders     History of anxiety    Presence of spectacles and contact lenses     Wears glasses        Past Surgical History:   Procedure Laterality Date    OTHER SURGICAL HISTORY  2019    Gallbladder surgery    OTHER SURGICAL HISTORY  2019     section    OTHER SURGICAL HISTORY  2019    Toe amputation    OTHER SURGICAL HISTORY  2019    Thyroid surgery        Family History   Problem Relation Name Age of Onset    No Known Problems Other          Allergies   Allergen Reactions     Aspirin GI Upset    Morphine Unknown    Other Unknown     Nonsteroidal Anti-Inflammatory Agents        Objective     Visit Vitals  Smoking Status Never        Physical Exam    PE:  General: Well-developed, well-nourished, no acute distress. The patient demonstrates no pain behavior, symptom magnification or overt drug-seeking behavior.  Eye: Pupils appropriate for room lighting  Neck/thyroid: No obvious goiter or enlargement of neck noted  Respiratory exam: Normal respiratory effort, unlabored respiration. No accessory muscle use noted  Cardiac exam: Bilateral radial pulses intact  Abdominal: Nondistended  Spine, cervical: Tenderness to paraspinous musculature paracervical region.  Flexion and extension intact with extension increasing symptoms which radiate to her shoulders.  Spine, lumbar: The patient is able to rise from a seated to standing position without hesitancy, push off, or delay. Gait is grossly nonantalgic. Tenderness to paraspinous musculature is noted lower lumbar spine.  Flexion and extension intact with extension increasing pain into her right lower extremity.  Positive straight leg raise right lower extremity.  Neurologic exam: Muscle strength is antigravity in all 4 extremities.  Equal muscle strength bilateral upper and lower extremities 5/5.  Psychiatric exam: Judgment and insight normal, affect normal, speech is fluent, affect appropriate, demonstrating no signs of hypersomnolence, sedation, or confusion          Assessment/Plan   Problem List Items Addressed This Visit             ICD-10-CM    Cervical neuropathy G54.2    Cervical postlaminectomy syndrome M96.1    Relevant Medications    oxyCODONE-acetaminophen (Percocet)  mg tablet (Start on 7/1/2024)    gabapentin (Neurontin) 600 mg tablet    Lumbar radiculopathy M54.16    Relevant Medications    oxyCODONE-acetaminophen (Percocet)  mg tablet (Start on 7/1/2024)    Cervical radiculopathy at C6 M54.12    Myofascial pain -  Primary M79.18    Peripheral neuropathy G62.9    Relevant Medications    gabapentin (Neurontin) 600 mg tablet    Lumbar stenosis with neurogenic claudication M48.062     49-year-old female with history of cervical postlaminectomy syndrome as well as lumbar symptoms consistent with lumbar stenosis with neurogenic claudication presenting for follow-up.  Patient has done well with lumbar epidural steroid injection L4-5 receiving sustained relief.  Previously completed formal physical therapy and does home exercises.  Presents at today's office visit with chronic low back pain radiating to bilateral lower extremities right greater than left as well as chronic cervical pain radiating to bilateral upper extremities left greater than right.  No changes in numbness and tingling to left upper extremity or right lower extremity.  No change in weakness.  Denies any issues with bowel/bladder function or balance.  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  She states that her gabapentin is helpful for this pain as well.  Continue to wean opiate to lowest dose possible.  Currently managing with a decreased dose of Percocet to 10 mg 2 times per day day as needed.  Tolerating in increased dose of gabapentin 600 mg 3 times daily and the addition of Robaxin for muscle tightness and spasm.  Repeated interlaminar lumbar epidural steroid injection at L4-5 which is continuing to provide some relief for lumbar radicular symptoms.  She would like to discuss repeating her lumbar epidural steroid injection in the fall when she returns for her follow-up visit.  She does think that between injections and medication her pain remains tolerable.  She has been able to increase her activity and recently was able to travel to [place] to visit family secondary to significant pain control.  At this time it is reasonable to continue her current pain regimen with further discussion about  injections at her next office visit.  Plan reviewed with patient at today's office visit.    -Continue a decreased dose of Percocet 10 mg up to 2 times per day as needed for pain.  Patient feels that the medication continues to provide significant relief without adverse effects.  We will continue at this dose for now.  If she fails to receive relief with this medication we may discuss further weaning to prevent tolerance and hyperalgesia.  -Continue gabapentin 600 mg 3 times daily.  The patient was counseled on the risks and potential side effects of gabapentin as well as its importance for dosage titration. Side effects included but were not limited to, drowsiness, sedation, cognitive decline, peripheral edema, weight gain, seizures, with abrupt withdrawal.  Patient was instructed to call the office with any concerns or side effects before abruptly stopping medication.   -Continue Robaxin 3 times per day as needed for muscle tightness and spasm.  -Advised patient that she may repeat her lumbar epidural steroid injection every 3 to 4 months as needed.  Further discussion about repeating this injection at her next office visit.  -Advised her to continue home stretches and exercises consistently.  -Patient will follow-up in 3 months or sooner if needed.    MEDICAL DECISION MAKING:    My impressions and treatment recommendations were discussed in detail with the patient, who verbalized understanding and had no further questions prior to discharge. Given the patient's report of reduced pain and improved functional ability without adverse effects,  it is reasonable to continue her reduced dose of oxycodone 10 mg up to 2 times per day as needed for pain.  The terms of the opioid agreement as well as the potential risks and adverse effects of the patient's medication regimen were discussed in detail. This includes if applicable due to dosage of medication permission to discuss and coordinate care with other treatment  providers relevant to the patients condition. The patient verbalized understanding.    Treatment goals were discussed in detail with the patient.  These goals include reduction of pain levels, improved levels of functioning, avoidance of medication side effect and lowest medication dose possible to achieve  these goals.  The patient was in full agreement with these goals.  Also discussed is the understanding that pain may not be eliminated by medication but that the goal of a better sustaining life through use of medication is appropriate.  Lifestyle modifications including weight management, stretching, diet, exercise and smoking are addressed at each office visit.  The patient provided a urine drug screen for routine monitoring and compliance.  This test may be given as frequently as every month based on the patient's individual opiate risk stratification and prescriber concerns for any aberrancies.  This test is indicated given the use of controlled substances for the patient's medical condition.  Unless otherwise noted the prior (s) urine drug testing results were consistent with prescribed medications.  There is no evidence of illicit drug use or additional medications ingested.     Risks and side effects of chronic opioid therapy including but not limited to tolerance, dependence, constipation, hyperalgesia, cognitive side effects, addiction and possible death due to overuse and or misuse were discussed. I also discussed that such medications when co-administered with other sedative agents including but not limited to alcohol, benzodiazepines, sedative hypnotics and illegal drugs could pose life threatening consequences including death.  I also explained the impact that the administration of such medication has on a patient with obstructive sleep apnea and continued recommendations for use of apnea devices if ordered are prescribed by other physicians.  In order to effectively and safely treat the pain, I also  emphasized the importance of compliance with the treatment plan as well as compliance with the terms of the opioid agreement which was reviewed in detail. I explained the importance of being responsible with the medications and to take these only as prescribed, never in excess and never for reasons other than pain reduction. The patient was counseled on keeping the medications safe and locked away from children and other adults as well as disposal methods and options. The patient understood the risks and instructions.      I also discussed with the patient in detail that based on the clinical response to the opioid medications and improvements of activities of daily living, sleep, and work performance in light of compliance with the treatment plan we can continue this form of therapy for the above chronic  pain.  The goal and rationale used for current treatment with chronic opioid medication is to control the pain and alleviate disability induced by the chronic pain condition noted above after failures of other non-opioid and nonpharmacological modalities to treat the chronic pain and the symptoms associated with have failed.  The patient understood the goals in terms of the above treatment plan and had no further questions prior to leaving the office today.    Given the patient's total MED, general use of daily opiates, or other coadministered medications in various classes the patient was offered a prescription for Narcan.  I instructed the patient that Narcan is for emergency use only and is worse suspected or known opiate overdose.  Call 911 prior to administration of this medication to activate the emergency response team.  It is imperative to fill this medication in order to demonstrate understanding of the gravity of possible side effects including respiratory depression and risk of overdose of this opiate load or medication combination.  As such patients will be required to bring Narcan prescriptions to  follow-up appointments as part of the compliance with continued opiate care.    Disclaimer: This note was transcribed using an audio transcription device.  As such, minor errors may be present with regard to spelling, punctuation, and inadvertent word insertion.  Please disregard such errors.                Relevant Medications    oxyCODONE-acetaminophen (Percocet)  mg tablet (Start on 7/1/2024)    gabapentin (Neurontin) 600 mg tablet     Other Visit Diagnoses         Codes    Muscle spasm     M62.838    Relevant Medications    methocarbamol (Robaxin) 500 mg tablet

## 2024-06-27 NOTE — ASSESSMENT & PLAN NOTE
49-year-old female with history of cervical postlaminectomy syndrome as well as lumbar symptoms consistent with lumbar stenosis with neurogenic claudication presenting for follow-up.  Patient has done well with lumbar epidural steroid injection L4-5 receiving sustained relief.  Previously completed formal physical therapy and does home exercises.  Presents at today's office visit with chronic low back pain radiating to bilateral lower extremities right greater than left as well as chronic cervical pain radiating to bilateral upper extremities left greater than right.  No changes in numbness and tingling to left upper extremity or right lower extremity.  No change in weakness.  Denies any issues with bowel/bladder function or balance.  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  She states that her gabapentin is helpful for this pain as well.  Continue to wean opiate to lowest dose possible.  Currently managing with a decreased dose of Percocet to 10 mg 2 times per day day as needed.  Tolerating in increased dose of gabapentin 600 mg 3 times daily and the addition of Robaxin for muscle tightness and spasm.  Repeated interlaminar lumbar epidural steroid injection at L4-5 which is continuing to provide some relief for lumbar radicular symptoms.  She would like to discuss repeating her lumbar epidural steroid injection in the fall when she returns for her follow-up visit.  She does think that between injections and medication her pain remains tolerable.  She has been able to increase her activity and recently was able to travel to [place] to visit family secondary to significant pain control.  At this time it is reasonable to continue her current pain regimen with further discussion about injections at her next office visit.  Plan reviewed with patient at today's office visit.    -Continue a decreased dose of Percocet 10 mg up to 2 times per day as needed for  pain.  Patient feels that the medication continues to provide significant relief without adverse effects.  We will continue at this dose for now.  If she fails to receive relief with this medication we may discuss further weaning to prevent tolerance and hyperalgesia.  -Continue gabapentin 600 mg 3 times daily.  The patient was counseled on the risks and potential side effects of gabapentin as well as its importance for dosage titration. Side effects included but were not limited to, drowsiness, sedation, cognitive decline, peripheral edema, weight gain, seizures, with abrupt withdrawal.  Patient was instructed to call the office with any concerns or side effects before abruptly stopping medication.   -Continue Robaxin 3 times per day as needed for muscle tightness and spasm.  -Advised patient that she may repeat her lumbar epidural steroid injection every 3 to 4 months as needed.  Further discussion about repeating this injection at her next office visit.  -Advised her to continue home stretches and exercises consistently.  -Patient will follow-up in 3 months or sooner if needed.    MEDICAL DECISION MAKING:    My impressions and treatment recommendations were discussed in detail with the patient, who verbalized understanding and had no further questions prior to discharge. Given the patient's report of reduced pain and improved functional ability without adverse effects,  it is reasonable to continue her reduced dose of oxycodone 10 mg up to 2 times per day as needed for pain.  The terms of the opioid agreement as well as the potential risks and adverse effects of the patient's medication regimen were discussed in detail. This includes if applicable due to dosage of medication permission to discuss and coordinate care with other treatment providers relevant to the patients condition. The patient verbalized understanding.    Treatment goals were discussed in detail with the patient.  These goals include reduction of  pain levels, improved levels of functioning, avoidance of medication side effect and lowest medication dose possible to achieve  these goals.  The patient was in full agreement with these goals.  Also discussed is the understanding that pain may not be eliminated by medication but that the goal of a better sustaining life through use of medication is appropriate.  Lifestyle modifications including weight management, stretching, diet, exercise and smoking are addressed at each office visit.  The patient provided a urine drug screen for routine monitoring and compliance.  This test may be given as frequently as every month based on the patient's individual opiate risk stratification and prescriber concerns for any aberrancies.  This test is indicated given the use of controlled substances for the patient's medical condition.  Unless otherwise noted the prior (s) urine drug testing results were consistent with prescribed medications.  There is no evidence of illicit drug use or additional medications ingested.     Risks and side effects of chronic opioid therapy including but not limited to tolerance, dependence, constipation, hyperalgesia, cognitive side effects, addiction and possible death due to overuse and or misuse were discussed. I also discussed that such medications when co-administered with other sedative agents including but not limited to alcohol, benzodiazepines, sedative hypnotics and illegal drugs could pose life threatening consequences including death.  I also explained the impact that the administration of such medication has on a patient with obstructive sleep apnea and continued recommendations for use of apnea devices if ordered are prescribed by other physicians.  In order to effectively and safely treat the pain, I also emphasized the importance of compliance with the treatment plan as well as compliance with the terms of the opioid agreement which was reviewed in detail. I explained the  importance of being responsible with the medications and to take these only as prescribed, never in excess and never for reasons other than pain reduction. The patient was counseled on keeping the medications safe and locked away from children and other adults as well as disposal methods and options. The patient understood the risks and instructions.      I also discussed with the patient in detail that based on the clinical response to the opioid medications and improvements of activities of daily living, sleep, and work performance in light of compliance with the treatment plan we can continue this form of therapy for the above chronic  pain.  The goal and rationale used for current treatment with chronic opioid medication is to control the pain and alleviate disability induced by the chronic pain condition noted above after failures of other non-opioid and nonpharmacological modalities to treat the chronic pain and the symptoms associated with have failed.  The patient understood the goals in terms of the above treatment plan and had no further questions prior to leaving the office today.    Given the patient's total MED, general use of daily opiates, or other coadministered medications in various classes the patient was offered a prescription for Narcan.  I instructed the patient that Narcan is for emergency use only and is worse suspected or known opiate overdose.  Call 911 prior to administration of this medication to activate the emergency response team.  It is imperative to fill this medication in order to demonstrate understanding of the gravity of possible side effects including respiratory depression and risk of overdose of this opiate load or medication combination.  As such patients will be required to bring Narcan prescriptions to follow-up appointments as part of the compliance with continued opiate care.    Disclaimer: This note was transcribed using an audio transcription device.  As such, minor  errors may be present with regard to spelling, punctuation, and inadvertent word insertion.  Please disregard such errors.

## 2024-06-28 LAB
AMPHETAMINES UR QL SCN: NORMAL
BARBITURATES UR QL SCN: NORMAL
BZE UR QL SCN: NORMAL
CANNABINOIDS UR QL SCN: NORMAL
CREAT UR-MCNC: 96.8 MG/DL (ref 20–320)
ETHANOL ?TM UR-MCNC: <10 MG/DL
PCP UR QL SCN: NORMAL

## 2024-07-03 LAB
1OH-MIDAZOLAM UR CFM-MCNC: <25 NG/ML
6MAM UR CFM-MCNC: <25 NG/ML
7AMINOCLONAZEPAM UR CFM-MCNC: <25 NG/ML
A-OH ALPRAZ UR CFM-MCNC: <25 NG/ML
ALPRAZ UR CFM-MCNC: <25 NG/ML
CHLORDIAZEP UR CFM-MCNC: <25 NG/ML
CLONAZEPAM UR CFM-MCNC: <25 NG/ML
CODEINE UR CFM-MCNC: <50 NG/ML
DIAZEPAM UR CFM-MCNC: <25 NG/ML
EDDP UR CFM-MCNC: <25 NG/ML
FENTANYL UR CFM-MCNC: <2.5 NG/ML
HYDROCODONE CTO UR CFM-MCNC: <25 NG/ML
HYDROMORPHONE UR CFM-MCNC: <25 NG/ML
LORAZEPAM UR CFM-MCNC: <25 NG/ML
METHADONE UR CFM-MCNC: <25 NG/ML
MIDAZOLAM UR CFM-MCNC: <25 NG/ML
MORPHINE UR CFM-MCNC: <50 NG/ML
NORDIAZEPAM UR CFM-MCNC: <25 NG/ML
NORFENTANYL UR CFM-MCNC: <2.5 NG/ML
NORHYDROCODONE UR CFM-MCNC: <25 NG/ML
NOROXYCODONE UR CFM-MCNC: 825 NG/ML
NORTAPENTADOL UR CFM-MCNC: <25 NG/ML
NORTRAMADOL UR-MCNC: <50 NG/ML
OXAZEPAM UR CFM-MCNC: <25 NG/ML
OXYCODONE UR CFM-MCNC: 685 NG/ML
OXYMORPHONE UR CFM-MCNC: 1218 NG/ML
TAPENTADOL UR CFM-MCNC: <25 NG/ML
TEMAZEPAM UR CFM-MCNC: <25 NG/ML
TRAMADOL UR CFM-MCNC: <50 NG/ML
ZOLPIDEM UR CFM-MCNC: <25 NG/ML
ZOLPIDEM UR-MCNC: <25 NG/ML

## 2024-07-22 DIAGNOSIS — M48.062 LUMBAR STENOSIS WITH NEUROGENIC CLAUDICATION: ICD-10-CM

## 2024-07-22 DIAGNOSIS — M54.16 LUMBAR RADICULOPATHY: ICD-10-CM

## 2024-07-22 DIAGNOSIS — M96.1 CERVICAL POSTLAMINECTOMY SYNDROME: ICD-10-CM

## 2024-07-22 NOTE — TELEPHONE ENCOUNTER
Pt is requesting refill of  Percocet  mg 1 tablet po BID [facility]                                                         LV:   6/27/24                  NV:  9/25/24                OARRS reviewed with LFD:  7/1/24 #60/30 days                          Pended RX to SHARA Chand for transmission to pharmacy.

## 2024-07-23 RX ORDER — OXYCODONE AND ACETAMINOPHEN 10; 325 MG/1; MG/1
1 TABLET ORAL 2 TIMES DAILY PRN
Qty: 60 TABLET | Refills: 0 | Status: SHIPPED | OUTPATIENT
Start: 2024-07-31 | End: 2024-08-30

## 2024-08-26 DIAGNOSIS — M48.062 LUMBAR STENOSIS WITH NEUROGENIC CLAUDICATION: ICD-10-CM

## 2024-08-26 DIAGNOSIS — M96.1 CERVICAL POSTLAMINECTOMY SYNDROME: ICD-10-CM

## 2024-08-26 DIAGNOSIS — M54.16 LUMBAR RADICULOPATHY: ICD-10-CM

## 2024-08-26 NOTE — TELEPHONE ENCOUNTER
Pt is requesting refill of    Percocet  mg 1 tablet po BID [facility]                                                       LV:    6/27/24                 NV:   9/25/24               OARRS reviewed with LFD:   7/31/24  #60/30 days                          Pended RX to SHARA Chand for transmission to pharmacy.

## 2024-08-28 RX ORDER — OXYCODONE AND ACETAMINOPHEN 10; 325 MG/1; MG/1
1 TABLET ORAL 2 TIMES DAILY PRN
Qty: 60 TABLET | Refills: 0 | Status: SHIPPED | OUTPATIENT
Start: 2024-08-30 | End: 2024-09-29

## 2024-09-25 ENCOUNTER — OFFICE VISIT (OUTPATIENT)
Dept: PAIN MEDICINE | Facility: HOSPITAL | Age: 49
End: 2024-09-25
Payer: MEDICARE

## 2024-09-25 VITALS
DIASTOLIC BLOOD PRESSURE: 73 MMHG | WEIGHT: 222.1 LBS | HEART RATE: 81 BPM | SYSTOLIC BLOOD PRESSURE: 109 MMHG | OXYGEN SATURATION: 99 % | RESPIRATION RATE: 16 BRPM | BODY MASS INDEX: 33.66 KG/M2 | HEIGHT: 68 IN

## 2024-09-25 DIAGNOSIS — M54.16 LUMBAR RADICULOPATHY: ICD-10-CM

## 2024-09-25 DIAGNOSIS — G54.2 CERVICAL NEUROPATHY: Primary | ICD-10-CM

## 2024-09-25 DIAGNOSIS — M48.062 LUMBAR STENOSIS WITH NEUROGENIC CLAUDICATION: ICD-10-CM

## 2024-09-25 DIAGNOSIS — M50.10 CERVICAL RADICULOPATHY DUE TO INTERVERTEBRAL DISC DISORDER: ICD-10-CM

## 2024-09-25 DIAGNOSIS — G62.89 OTHER POLYNEUROPATHY: ICD-10-CM

## 2024-09-25 DIAGNOSIS — M96.1 CERVICAL POSTLAMINECTOMY SYNDROME: ICD-10-CM

## 2024-09-25 PROCEDURE — 99214 OFFICE O/P EST MOD 30 MIN: CPT | Performed by: CLINICAL NURSE SPECIALIST

## 2024-09-25 PROCEDURE — 3008F BODY MASS INDEX DOCD: CPT | Performed by: CLINICAL NURSE SPECIALIST

## 2024-09-25 PROCEDURE — 1036F TOBACCO NON-USER: CPT | Performed by: CLINICAL NURSE SPECIALIST

## 2024-09-25 RX ORDER — CYCLOBENZAPRINE HCL 10 MG
10 TABLET ORAL 3 TIMES DAILY PRN
COMMUNITY

## 2024-09-25 RX ORDER — TIRZEPATIDE 2.5 MG/.5ML
2.5 INJECTION, SOLUTION SUBCUTANEOUS
COMMUNITY

## 2024-09-25 RX ORDER — OXYCODONE AND ACETAMINOPHEN 10; 325 MG/1; MG/1
1 TABLET ORAL 2 TIMES DAILY PRN
Qty: 60 TABLET | Refills: 0 | Status: SHIPPED | OUTPATIENT
Start: 2024-09-29 | End: 2024-10-29

## 2024-09-25 RX ORDER — GABAPENTIN 600 MG/1
600 TABLET ORAL 3 TIMES DAILY
Qty: 90 TABLET | Refills: 2 | Status: SHIPPED | OUTPATIENT
Start: 2024-09-25 | End: 2024-10-25

## 2024-09-25 ASSESSMENT — COLUMBIA-SUICIDE SEVERITY RATING SCALE - C-SSRS
6. HAVE YOU EVER DONE ANYTHING, STARTED TO DO ANYTHING, OR PREPARED TO DO ANYTHING TO END YOUR LIFE?: NO
1. IN THE PAST MONTH, HAVE YOU WISHED YOU WERE DEAD OR WISHED YOU COULD GO TO SLEEP AND NOT WAKE UP?: NO
2. HAVE YOU ACTUALLY HAD ANY THOUGHTS OF KILLING YOURSELF?: NO

## 2024-09-25 ASSESSMENT — LIFESTYLE VARIABLES: TOTAL SCORE: 1

## 2024-09-25 ASSESSMENT — ENCOUNTER SYMPTOMS
OCCASIONAL FEELINGS OF UNSTEADINESS: 1
LOSS OF SENSATION IN FEET: 0
DEPRESSION: 0

## 2024-09-25 NOTE — PROGRESS NOTES
Subjective   Patient ID: Breanne Obregon is a 49 y.o. female who presents for cervical radicular pain and lumbar radicular pain  HPI  49-year-old female with history of lumbar central canal stenosis resulting in lumbar radicular pain as well as cervical radicular pain presents for follow-up. She is followed by a surgeon at the [facility] for cervical postlaminectomy syndrome and continues to feel that the symptoms remain stable. Her lumbar radicular symptoms are most bothersome. Lumbar MRI consistent with multilevel degenerative changes with varying degrees of central canal stenosis. She responds well to lumbar epidural steroid injections at L4-5 which provide significant/sustained relief. Continued relief with most recent lumbar epidural steroid injection on 3/8/2024.  Manages her pain with oxycodone 10 mg which she takes 1-2 times per day as needed, gabapentin 600 mg 3 times daily and Robaxin for muscle tightness and spasm.  Presenting at today's office visit for routine follow-up.  Currently experiencing cervical pain which radiates to bilateral upper extremities left greater than right.  Chronic numbness and tingling in her left upper extremity most noted in her left hand unchanged from prior exam.  She denies any upper extremity weakness or issues with balance.  Most bothered at today's visit by an increase in her low back pain with worsening radicular symptoms in bilateral lower extremities.  Pain is radiating laterally and posteriorly to bilateral lower extremities to the level of her feet.  She has numbness and tingling in her bilateral lower extremities from her knees distally to her feet right greater than left.  She has noted an increase in lower extremity weakness most noted in her right lower extremity.  She denies any issues with bowel/bladder changes or saddle anesthesia.  Continues to have phantom pain in her right foot where she had several toes amputated.  Pain is described as aching,  "throbbing and can be sharp.  Pain increases with most activity including, walking and bending.  She has noted difficulty driving secondary to right lower extremity weakness.  Patient was recently evaluated by an orthopedic surgeon at [facility] in [place] with repeat lumbar MRI.  She provided her disc to have our radiology department download the images into our system.  Patient states that her surgeon  recommended lumbar surgery, however, she must decrease her hemoglobin A1c prior to surgery.  Currently working on decreasing her A1c and will follow-up with her PCP in the next few weeks.  She is hopeful to proceed with surgery in November.  She was provided a back brace which has been beneficial and provided stability as well as pain relief.  She also was rotated from Robaxin to Flexeril for worsening muscle tightness and spasms.  She does feel that Flexeril has been more helpful. Continues to manage her pain with Percocet 10 mg which she has been taking twice per day.  Continued benefit with gabapentin 600 mg 3 times daily.  She was given steroids per PCP and stated that it increased her blood glucose levels and provided limited relief.    Location of Pain:  Patient returns to the office for re-evaluation of \"chronic lower back pain that radiates down the lateral and posterior side of bilateral legs stopping in the foot. Right side worse. Increased spasms down the right leg.  pt states she also has neck pain, described as spasms that radiates down the left arm into the hand causing pain and numbness. Three toes amputated causing foot pain.    Will have surgery with Dr. Cedeño when A1C is decreased     Pain Score: 7/10     Treatment: Percocet  BID  Last dose: 9/25/24 1 dose and BID  Medication Count: 11 pills left in rx bottle  Fill date: 8/30/24      Efficacy: 95% relief for 8 hours     Side Effects: none     Narcan: 09/2025     Other pain medication/neuromodulator: Gabapentin- needs refill/Flexeril " from [facility]     Injections and/or Procedures: LESI L4-L5 03/08/2024 with 65% relief  OARRS:  No data recorded  I have personally reviewed the OARRS report for Breanne Obregon. I have considered the risks of abuse, dependence, addiction and diversion    Is the patient prescribed a combination of a benzodiazepine and opioid?  No    Last Urine Drug Screen / ordered today: No  Recent Results (from the past 8760 hour(s))   Confirmation Opiate/Opioid/Benzo Prescription Compliance    Collection Time: 06/27/24  4:00 PM   Result Value Ref Range    Clonazepam <25 <25 ng/mL    7-Aminoclonazepam <25 <25 ng/mL    Alprazolam <25 <25 ng/mL    Alpha-Hydroxyalprazolam <25 <25 ng/mL    Midazolam <25 <25 ng/mL    Alpha-Hydroxymidazolam <25 <25 ng/mL    Chlordiazepoxide <25 <25 ng/mL    Diazepam <25 <25 ng/mL    Nordiazepam <25 <25 ng/mL    Temazepam <25 <25 ng/mL    Oxazepam <25 <25 ng/mL    Lorazepam <25 <25 ng/mL    Methadone <25 <25 ng/mL    EDDP <25 <25 ng/mL    6-Acetylmorphine <25 <25 ng/mL    Codeine <50 <50 ng/mL    Hydrocodone <25 <25 ng/mL    Hydromorphone <25 <25 ng/mL    Morphine  <50 <50 ng/mL    Norhydrocodone <25 <25 ng/mL    Noroxycodone 825 (H) <25 ng/mL    Oxycodone 685 (H) <25 ng/mL    Oxymorphone 1,218 (H) <25 ng/mL    Fentanyl <2.5 <2.5 ng/mL    Norfentanyl <2.5 <2.5 ng/mL    Tramadol <50 <50 ng/mL    O-Desmethyltramadol <50 <50 ng/mL    Zolpidem <25 <25 ng/mL    Zolpidem Metabolite (ZCA) <25 <25 ng/mL   Screen Opiate/Opioid/Benzo Prescription Compliance    Collection Time: 06/27/24  4:00 PM   Result Value Ref Range    Creatinine, Urine Random 96.8 20.0 - 320.0 mg/dL    Amphetamine Screen, Urine Presumptive Negative Presumptive Negative    Barbiturate Screen, Urine Presumptive Negative Presumptive Negative    Cannabinoid Screen, Urine Presumptive Negative Presumptive Negative    Cocaine Metabolite Screen, Urine Presumptive Negative Presumptive Negative    PCP Screen, Urine Presumptive Negative  Presumptive Negative   Tapentadol Confirmation, Urine    Collection Time: 06/27/24  4:00 PM   Result Value Ref Range    Tapentadol <25 <25 ng/mL    N-Desmethyltapentadol <25 <25 ng/mL   Buprenorphine Confirm,Urine    Collection Time: 06/27/24  4:00 PM   Result Value Ref Range    BUPRENORPHINE GLUC, URINE <5 ng/mL    BUPRENORPHINE ,URINE <2 ng/mL    NALOXONE, URINE <100 ng/mL    NORBUPRENORPHINE GLUC,URINE <5 ng/mL    NORBUPRENORPHINE, URINE <2 ng/mL     Results are as expected.     Controlled Substance Agreement:  Date of the Last Agreement: 9/25/24  Reviewed Controlled Substance Agreement including but not limited to the benefits, risks, and alternatives to treatment with a Controlled Substance medication(s).    Monitoring and compliance:    ORT:    PDUQ:    Office Agreement:      Review of Systems    ROS:   General: No fevers, chills, weight loss  Skin: Negative for lesions  Eyes: No acute vision changes  Ears: No vertigo  Nose, mouth, throat: No difficulty swallowing or speaking  Respiratory: No cough, shortness of breath, cyanosis  Cardiovascular: Negative for chest pain syncope or palpitation  Gastrointestinal: No constipation, nausea, vomiting  Neurological: Negative for headache, positive for: Paresthesia and weakness  Psychological: Negative for severe or debilitating anxiety, depression. Negative memory loss  Musculoskeletal: Positive for arthralgia, myalgia, pain and spasm  Endocrine: Negative for weight gain, appetite changes, excessive sweating  Allergy/immune: Negative    All 13 systems were reviewed and are within normal levels except as noted or in the history of present illness.  Positive or pertinent negative responses are noted or were in the history of present illness. As noted, the patient denies significant or impairing weakness in the bilateral upper and lower extremities, medication induced constipation, and bowel or bladder incontinence.     Current Outpatient Medications:     Accu-Chek Guide  test strips strip, TEST 3 TIMES A DAY AS DIRECTED, Disp: , Rfl:     alcohol swabs (BD Alcohol Swabs) pads, medicated, Apply topically., Disp: , Rfl:     ARIPiprazole (Abilify) 10 mg tablet, Take 1 tablet (10 mg) by mouth once daily., Disp: , Rfl:     atorvastatin (Lipitor) 10 mg tablet, Take 1 tablet (10 mg) by mouth once daily at bedtime., Disp: , Rfl:     b complex vitamins (Vitamins B Complex) capsule, Vitamin B Complex CAPS  Refills: 0     Active, Disp: , Rfl:     ferrous gluconate 324 (38 Fe) MG tablet, Take 1 tablet (324 mg) by mouth once daily., Disp: , Rfl:     gabapentin (Neurontin) 600 mg tablet, Take 1 tablet (600 mg) by mouth 3 times a day., Disp: 90 tablet, Rfl: 2    glipiZIDE (Glucotrol) 10 mg tablet, Take 1 tablet (10 mg) by mouth every 12 hours., Disp: , Rfl:     lamoTRIgine (LaMICtal) 100 mg tablet, Take 1 tablet (100 mg) by mouth 2 times a day., Disp: , Rfl:     levothyroxine (Synthroid, Levoxyl) 50 mcg tablet, , Disp: , Rfl:     lidocaine (Lidoderm) 5 % patch, Place 1 patch on the skin once daily. Remove & discard patch within 12 hours or as directed by MD., Disp: , Rfl:     metFORMIN (Glucophage) 1,000 mg tablet, Take 1 tablet (1,000 mg) by mouth 2 times a day., Disp: , Rfl:     methocarbamol (Robaxin) 500 mg tablet, Take 1 tablet (500 mg) by mouth 3 times a day as needed for muscle spasms., Disp: 90 tablet, Rfl: 2    oxyCODONE-acetaminophen (Percocet)  mg tablet, Take 1 tablet by mouth 2 times a day as needed for severe pain (7 - 10). Do not fill before August 30, 2024., Disp: 60 tablet, Rfl: 0    pantoprazole (ProtoNix) 40 mg EC tablet, Take 1 tablet (40 mg) by mouth every 12 hours., Disp: , Rfl:     pyridoxine HCl, vitamin B6, (VITAMIN B-6 ORAL), Vitamin B-6 TABS  Refills: 0     Active, Disp: , Rfl:     scopolamine (Transderm-Scop) 1 mg over 3 days patch 3 day, Place 1 patch on the skin every 3rd day. For motion sickness, Disp: , Rfl:     semaglutide (Ozempic) 0.25 mg or 0.5 mg(2 mg/1.5  mL) pen injector, Inject under the skin 1 (one) time per week., Disp: , Rfl:      Past Medical History:   Diagnosis Date    Personal history of other diseases of the musculoskeletal system and connective tissue     History of arthritis    Personal history of other mental and behavioral disorders     History of depression    Personal history of other mental and behavioral disorders     History of anxiety    Presence of spectacles and contact lenses     Wears glasses        Past Surgical History:   Procedure Laterality Date    OTHER SURGICAL HISTORY  2019    Gallbladder surgery    OTHER SURGICAL HISTORY  2019     section    OTHER SURGICAL HISTORY  2019    Toe amputation    OTHER SURGICAL HISTORY  2019    Thyroid surgery        Family History   Problem Relation Name Age of Onset    No Known Problems Other          Allergies   Allergen Reactions    Aspirin GI Upset    Morphine Unknown    Other Unknown     Nonsteroidal Anti-Inflammatory Agents        Objective     Visit Vitals  Smoking Status Never        Physical Exam    PE:  General: Well-developed, well-nourished, no acute distress. The patient demonstrates no pain behavior, symptom magnification or overt drug-seeking behavior.  Eye: Pupils appropriate for room lighting  Neck/thyroid: No obvious goiter or enlargement of neck noted  Respiratory exam: Normal respiratory effort, unlabored respiration. No accessory muscle use noted  Cardiac exam: Bilateral radial pulses intact  Abdominal: Nondistended  Spine, cervical: Tenderness to paraspinous musculature paracervical region bilaterally.  Flexion intact with extension limited and increasing pain radiating to bilateral shoulders.  Spine, lumbar: The patient is able to rise from a seated to standing position without hesitancy, push off, or delay. Gait is grossly nonantalgic. Tenderness to paraspinous musculature is noted lower lumbar region.  Flexion and extension both limited secondary to  pain.  Positive straight leg raise right lower extremity.  Neurologic exam: Muscle strength is antigravity in all 4 extremities.  Equal muscle strength bilateral upper extremities 5/5.  Decreased strength right lower extremity 4/5 and left lower extremity 3/5.  Diminished sensation to light touch from the knee distally to foot aspect right calf and left foot  Psychiatric exam: Judgment and insight normal, affect normal, speech is fluent, affect appropriate, demonstrating no signs of hypersomnolence, sedation, or confusion        Assessment/Plan   Problem List Items Addressed This Visit             ICD-10-CM    Cervical neuropathy - Primary G54.2    Cervical postlaminectomy syndrome M96.1    Relevant Medications    oxyCODONE-acetaminophen (Percocet)  mg tablet (Start on 9/29/2024)    gabapentin (Neurontin) 600 mg tablet    Lumbar radiculopathy M54.16    Relevant Medications    oxyCODONE-acetaminophen (Percocet)  mg tablet (Start on 9/29/2024)    Cervical radiculopathy due to intervertebral disc disorder M50.10    Peripheral neuropathy G62.9    Relevant Medications    gabapentin (Neurontin) 600 mg tablet    Lumbar stenosis with neurogenic claudication M48.062     49-year-old female with history of cervical postlaminectomy syndrome as well as lumbar symptoms consistent with lumbar stenosis with neurogenic claudication presenting for follow-up.  Patient has done well with lumbar epidural steroid injection L4-5 receiving sustained relief.  Previously completed formal physical therapy and does home exercises.  Presents at today's office visit with an increase in lumbar radicular symptoms accompanied by increasing weakness right lower extremity.  Denies any changes in bowel/bladder function or saddle anesthesia.  Evaluated by a surgeon at [facility] with recommendation to proceed with lumbar surgery.  Before patient can proceed with lumbar surgery she does decrease her hemoglobin A1c.  Currently  working on decreasing her hemoglobin A1c with her PCP.  Persistent cervical pain unchanged from previous exam.  Continues to be followed by a surgeon at the [facility] for cervical postlaminectomy syndrome.  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  She states that her gabapentin is helpful for this pain as well.  Continue to wean opiate to lowest dose possible.  Currently managing with a decreased dose of Percocet to 10 mg 2 times per day day as needed.  Tolerating in increased dose of gabapentin 600 mg 3 times daily.  Rotated from Robaxin to Flexeril for worsening muscle tightness and spasms.  She does feel that Flexeril is more effective.  Plan reviewed with patient at today's office visit.    -Continue a decreased dose of Percocet 10 mg up to 2 times per day as needed for pain.  Patient feels that the medication continues to provide significant relief without adverse effects.   -Continue gabapentin 600 mg 3 times daily.  The patient was counseled on the risks and potential side effects of gabapentin as well as its importance for dosage titration. Side effects included but were not limited to, drowsiness, sedation, cognitive decline, peripheral edema, weight gain, seizures, with abrupt withdrawal.  Patient was instructed to call the office with any concerns or side effects before abruptly stopping medication.   -Continue Flexeril for muscle tightness and spasm.    -Patient will follow-up with her surgeon at [facility] once she has decreased her hemoglobin A1c.  -Continue to follow-up with her surgeon at the [facility] for cervical postlaminectomy syndrome.  -Patient will follow-up in 3 months or sooner if needed.    MEDICAL DECISION MAKING:    My impressions and treatment recommendations were discussed in detail with the patient, who verbalized understanding and had no further questions prior to discharge. Given the patient's report of reduced  pain and improved functional ability without adverse effects,  it is reasonable to continue her reduced dose of oxycodone 10 mg up to 2 times per day as needed for pain.  The terms of the opioid agreement as well as the potential risks and adverse effects of the patient's medication regimen were discussed in detail. This includes if applicable due to dosage of medication permission to discuss and coordinate care with other treatment providers relevant to the patients condition. The patient verbalized understanding.    Treatment goals were discussed in detail with the patient.  These goals include reduction of pain levels, improved levels of functioning, avoidance of medication side effect and lowest medication dose possible to achieve  these goals.  The patient was in full agreement with these goals.  Also discussed is the understanding that pain may not be eliminated by medication but that the goal of a better sustaining life through use of medication is appropriate.  Lifestyle modifications including weight management, stretching, diet, exercise and smoking are addressed at each office visit.  The patient provided a urine drug screen for routine monitoring and compliance.  This test may be given as frequently as every month based on the patient's individual opiate risk stratification and prescriber concerns for any aberrancies.  This test is indicated given the use of controlled substances for the patient's medical condition.  Unless otherwise noted the prior (s) urine drug testing results were consistent with prescribed medications.  There is no evidence of illicit drug use or additional medications ingested.     Risks and side effects of chronic opioid therapy including but not limited to tolerance, dependence, constipation, hyperalgesia, cognitive side effects, addiction and possible death due to overuse and or misuse were discussed. I also discussed that such medications when co-administered with other  sedative agents including but not limited to alcohol, benzodiazepines, sedative hypnotics and illegal drugs could pose life threatening consequences including death.  I also explained the impact that the administration of such medication has on a patient with obstructive sleep apnea and continued recommendations for use of apnea devices if ordered are prescribed by other physicians.  In order to effectively and safely treat the pain, I also emphasized the importance of compliance with the treatment plan as well as compliance with the terms of the opioid agreement which was reviewed in detail. I explained the importance of being responsible with the medications and to take these only as prescribed, never in excess and never for reasons other than pain reduction. The patient was counseled on keeping the medications safe and locked away from children and other adults as well as disposal methods and options. The patient understood the risks and instructions.      I also discussed with the patient in detail that based on the clinical response to the opioid medications and improvements of activities of daily living, sleep, and work performance in light of compliance with the treatment plan we can continue this form of therapy for the above chronic  pain.  The goal and rationale used for current treatment with chronic opioid medication is to control the pain and alleviate disability induced by the chronic pain condition noted above after failures of other non-opioid and nonpharmacological modalities to treat the chronic pain and the symptoms associated with have failed.  The patient understood the goals in terms of the above treatment plan and had no further questions prior to leaving the office today.    Given the patient's total MED, general use of daily opiates, or other coadministered medications in various classes the patient was offered a prescription for Narcan.  I instructed the patient that Narcan is for emergency  use only and is worse suspected or known opiate overdose.  Call 911 prior to administration of this medication to activate the emergency response team.  It is imperative to fill this medication in order to demonstrate understanding of the gravity of possible side effects including respiratory depression and risk of overdose of this opiate load or medication combination.  As such patients will be required to bring Narcan prescriptions to follow-up appointments as part of the compliance with continued opiate care.    Disclaimer: This note was transcribed using an audio transcription device.  As such, minor errors may be present with regard to spelling, punctuation, and inadvertent word insertion.  Please disregard such errors.                Relevant Medications    oxyCODONE-acetaminophen (Percocet)  mg tablet (Start on 9/29/2024)    gabapentin (Neurontin) 600 mg tablet

## 2024-09-26 NOTE — ASSESSMENT & PLAN NOTE
49-year-old female with history of cervical postlaminectomy syndrome as well as lumbar symptoms consistent with lumbar stenosis with neurogenic claudication presenting for follow-up.  Patient has done well with lumbar epidural steroid injection L4-5 receiving sustained relief.  Previously completed formal physical therapy and does home exercises.  Presents at today's office visit with an increase in lumbar radicular symptoms accompanied by increasing weakness right lower extremity.  Denies any changes in bowel/bladder function or saddle anesthesia.  Evaluated by a surgeon at [facility] in [place] with recommendation to proceed with lumbar surgery.  Before patient can proceed with lumbar surgery she does decrease her hemoglobin A1c.  Currently working on decreasing her hemoglobin A1c with her PCP.  Persistent cervical pain unchanged from previous exam.  Continues to be followed by a surgeon at the [facility] for cervical postlaminectomy syndrome.  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  She states that her gabapentin is helpful for this pain as well.  Continue to wean opiate to lowest dose possible.  Currently managing with a decreased dose of Percocet to 10 mg 2 times per day day as needed.  Tolerating in increased dose of gabapentin 600 mg 3 times daily.  Rotated from Robaxin to Flexeril for worsening muscle tightness and spasms.  She does feel that Flexeril is more effective.  Plan reviewed with patient at today's office visit.    -Continue a decreased dose of Percocet 10 mg up to 2 times per day as needed for pain.  Patient feels that the medication continues to provide significant relief without adverse effects.   -Continue gabapentin 600 mg 3 times daily.  The patient was counseled on the risks and potential side effects of gabapentin as well as its importance for dosage titration. Side effects included but were not limited to, drowsiness,  sedation, cognitive decline, peripheral edema, weight gain, seizures, with abrupt withdrawal.  Patient was instructed to call the office with any concerns or side effects before abruptly stopping medication.   -Continue Flexeril for muscle tightness and spasm.    -Patient will follow-up with her surgeon at [facility] once she has decreased her hemoglobin A1c.  -Continue to follow-up with her surgeon at the [facility] for cervical postlaminectomy syndrome.  -Patient will follow-up in 3 months or sooner if needed.    MEDICAL DECISION MAKING:    My impressions and treatment recommendations were discussed in detail with the patient, who verbalized understanding and had no further questions prior to discharge. Given the patient's report of reduced pain and improved functional ability without adverse effects,  it is reasonable to continue her reduced dose of oxycodone 10 mg up to 2 times per day as needed for pain.  The terms of the opioid agreement as well as the potential risks and adverse effects of the patient's medication regimen were discussed in detail. This includes if applicable due to dosage of medication permission to discuss and coordinate care with other treatment providers relevant to the patients condition. The patient verbalized understanding.    Treatment goals were discussed in detail with the patient.  These goals include reduction of pain levels, improved levels of functioning, avoidance of medication side effect and lowest medication dose possible to achieve  these goals.  The patient was in full agreement with these goals.  Also discussed is the understanding that pain may not be eliminated by medication but that the goal of a better sustaining life through use of medication is appropriate.  Lifestyle modifications including weight management, stretching, diet, exercise and smoking are addressed at each office visit.  The patient provided a urine drug screen for routine monitoring and  compliance.  This test may be given as frequently as every month based on the patient's individual opiate risk stratification and prescriber concerns for any aberrancies.  This test is indicated given the use of controlled substances for the patient's medical condition.  Unless otherwise noted the prior (s) urine drug testing results were consistent with prescribed medications.  There is no evidence of illicit drug use or additional medications ingested.     Risks and side effects of chronic opioid therapy including but not limited to tolerance, dependence, constipation, hyperalgesia, cognitive side effects, addiction and possible death due to overuse and or misuse were discussed. I also discussed that such medications when co-administered with other sedative agents including but not limited to alcohol, benzodiazepines, sedative hypnotics and illegal drugs could pose life threatening consequences including death.  I also explained the impact that the administration of such medication has on a patient with obstructive sleep apnea and continued recommendations for use of apnea devices if ordered are prescribed by other physicians.  In order to effectively and safely treat the pain, I also emphasized the importance of compliance with the treatment plan as well as compliance with the terms of the opioid agreement which was reviewed in detail. I explained the importance of being responsible with the medications and to take these only as prescribed, never in excess and never for reasons other than pain reduction. The patient was counseled on keeping the medications safe and locked away from children and other adults as well as disposal methods and options. The patient understood the risks and instructions.      I also discussed with the patient in detail that based on the clinical response to the opioid medications and improvements of activities of daily living, sleep, and work performance in light of compliance with the  treatment plan we can continue this form of therapy for the above chronic  pain.  The goal and rationale used for current treatment with chronic opioid medication is to control the pain and alleviate disability induced by the chronic pain condition noted above after failures of other non-opioid and nonpharmacological modalities to treat the chronic pain and the symptoms associated with have failed.  The patient understood the goals in terms of the above treatment plan and had no further questions prior to leaving the office today.    Given the patient's total MED, general use of daily opiates, or other coadministered medications in various classes the patient was offered a prescription for Narcan.  I instructed the patient that Narcan is for emergency use only and is worse suspected or known opiate overdose.  Call 911 prior to administration of this medication to activate the emergency response team.  It is imperative to fill this medication in order to demonstrate understanding of the gravity of possible side effects including respiratory depression and risk of overdose of this opiate load or medication combination.  As such patients will be required to bring Narcan prescriptions to follow-up appointments as part of the compliance with continued opiate care.    Disclaimer: This note was transcribed using an audio transcription device.  As such, minor errors may be present with regard to spelling, punctuation, and inadvertent word insertion.  Please disregard such errors.

## 2024-10-01 ENCOUNTER — HOSPITAL ENCOUNTER (OUTPATIENT)
Dept: RADIOLOGY | Facility: EXTERNAL LOCATION | Age: 49
Discharge: HOME | End: 2024-10-01

## 2024-10-21 DIAGNOSIS — M54.16 LUMBAR RADICULOPATHY: ICD-10-CM

## 2024-10-21 DIAGNOSIS — M96.1 CERVICAL POSTLAMINECTOMY SYNDROME: ICD-10-CM

## 2024-10-21 DIAGNOSIS — M48.062 LUMBAR STENOSIS WITH NEUROGENIC CLAUDICATION: ICD-10-CM

## 2024-10-21 NOTE — TELEPHONE ENCOUNTER
Pt is requesting refill of   Percocet  mg 1 tablet po BID Michael Aragon                                                        LV:   9/25/24                 NV:   12/16/24               OARRS reviewed with LFD:   10/1/24 #60/30 days                         Pended RX to SHARA Chand for transmission to pharmacy.

## 2024-10-22 RX ORDER — OXYCODONE AND ACETAMINOPHEN 10; 325 MG/1; MG/1
1 TABLET ORAL 2 TIMES DAILY PRN
Qty: 60 TABLET | Refills: 0 | Status: SHIPPED | OUTPATIENT
Start: 2024-10-31 | End: 2024-11-30

## 2024-11-22 DIAGNOSIS — M96.1 CERVICAL POSTLAMINECTOMY SYNDROME: ICD-10-CM

## 2024-11-22 DIAGNOSIS — M48.062 LUMBAR STENOSIS WITH NEUROGENIC CLAUDICATION: ICD-10-CM

## 2024-11-22 DIAGNOSIS — M54.16 LUMBAR RADICULOPATHY: ICD-10-CM

## 2024-11-22 RX ORDER — OXYCODONE AND ACETAMINOPHEN 10; 325 MG/1; MG/1
1 TABLET ORAL 2 TIMES DAILY PRN
Qty: 60 TABLET | Refills: 0 | Status: SHIPPED | OUTPATIENT
Start: 2024-11-30 | End: 2024-12-30

## 2024-11-22 NOTE — TELEPHONE ENCOUNTER
Requested refill for Percocet [id number] BID #[id number] for 30 days.  LV: 9/25/24  NV: 12/16/24  [place] verified LFD: 10/31/24  Last SD: 10/31/24  Next SD: 11/30/24  [facility]

## 2024-12-16 ENCOUNTER — OFFICE VISIT (OUTPATIENT)
Dept: PAIN MEDICINE | Facility: HOSPITAL | Age: 49
End: 2024-12-16
Payer: COMMERCIAL

## 2024-12-16 VITALS
BODY MASS INDEX: 32.63 KG/M2 | RESPIRATION RATE: 16 BRPM | HEIGHT: 68 IN | OXYGEN SATURATION: 99 % | HEART RATE: 102 BPM | DIASTOLIC BLOOD PRESSURE: 83 MMHG | SYSTOLIC BLOOD PRESSURE: 120 MMHG | WEIGHT: 215.3 LBS

## 2024-12-16 DIAGNOSIS — Z79.891 LONG TERM PRESCRIPTION OPIATE USE: Primary | ICD-10-CM

## 2024-12-16 DIAGNOSIS — G62.89 OTHER POLYNEUROPATHY: ICD-10-CM

## 2024-12-16 DIAGNOSIS — G54.2 CERVICAL NEUROPATHY: ICD-10-CM

## 2024-12-16 DIAGNOSIS — M96.1 CERVICAL POSTLAMINECTOMY SYNDROME: ICD-10-CM

## 2024-12-16 DIAGNOSIS — M54.16 LUMBAR RADICULOPATHY: ICD-10-CM

## 2024-12-16 DIAGNOSIS — M48.062 LUMBAR STENOSIS WITH NEUROGENIC CLAUDICATION: ICD-10-CM

## 2024-12-16 LAB
AMPHETAMINES UR QL SCN: NORMAL
BARBITURATES UR QL SCN: NORMAL
BZE UR QL SCN: NORMAL
CANNABINOIDS UR QL SCN: NORMAL
CREAT UR-MCNC: 263.8 MG/DL (ref 20–320)
ETHANOL ?TM UR-MCNC: <10 MG/DL
PCP UR QL SCN: NORMAL

## 2024-12-16 PROCEDURE — 99214 OFFICE O/P EST MOD 30 MIN: CPT | Performed by: CLINICAL NURSE SPECIALIST

## 2024-12-16 PROCEDURE — 80348 DRUG SCREENING BUPRENORPHINE: CPT | Performed by: CLINICAL NURSE SPECIALIST

## 2024-12-16 PROCEDURE — 80372 DRUG SCREENING TAPENTADOL: CPT | Mod: PORLAB,MUE | Performed by: CLINICAL NURSE SPECIALIST

## 2024-12-16 PROCEDURE — 80346 BENZODIAZEPINES1-12: CPT | Mod: PORLAB | Performed by: CLINICAL NURSE SPECIALIST

## 2024-12-16 PROCEDURE — 80307 DRUG TEST PRSMV CHEM ANLYZR: CPT | Mod: PORLAB,MUE | Performed by: CLINICAL NURSE SPECIALIST

## 2024-12-16 PROCEDURE — 80307 DRUG TEST PRSMV CHEM ANLYZR: CPT | Mod: PORLAB | Performed by: CLINICAL NURSE SPECIALIST

## 2024-12-16 RX ORDER — GABAPENTIN 600 MG/1
600 TABLET ORAL 3 TIMES DAILY
Qty: 90 TABLET | Refills: 2 | Status: SHIPPED | OUTPATIENT
Start: 2024-12-16 | End: 2025-01-15

## 2024-12-16 RX ORDER — OXYCODONE AND ACETAMINOPHEN 10; 325 MG/1; MG/1
1 TABLET ORAL 2 TIMES DAILY PRN
Qty: 60 TABLET | Refills: 0 | Status: SHIPPED | OUTPATIENT
Start: 2024-12-30 | End: 2025-01-29

## 2024-12-16 ASSESSMENT — PATIENT HEALTH QUESTIONNAIRE - PHQ9
2. FEELING DOWN, DEPRESSED OR HOPELESS: NOT AT ALL
1. LITTLE INTEREST OR PLEASURE IN DOING THINGS: NOT AT ALL
SUM OF ALL RESPONSES TO PHQ9 QUESTIONS 1 AND 2: 0

## 2024-12-16 ASSESSMENT — ENCOUNTER SYMPTOMS
DEPRESSION: 0
LOSS OF SENSATION IN FEET: 1
OCCASIONAL FEELINGS OF UNSTEADINESS: 1

## 2024-12-16 NOTE — ASSESSMENT & PLAN NOTE
49-year-old female with history of cervical postlaminectomy syndrome as well as lumbar symptoms consistent with lumbar stenosis with neurogenic claudication presenting for follow-up.  Patient has done well with lumbar epidural steroid injection L4-5 receiving sustained relief.  Previously completed formal physical therapy and does home exercises.  Presents at today's office visit with an persistent increase in lumbar radicular symptoms accompanied by increasing weakness right lower extremity.  Denies any changes in bowel/bladder function or saddle anesthesia.  Evaluated by a surgeon at [facility] in [place] with recommendation to proceed with lumbar surgery.  Patient successfully reduced her hemoglobin A1c and is planning lumbar surgery for January 2025. Persistent cervical pain unchanged from previous exam.  Continues to be followed by a surgeon at the [facility] for cervical postlaminectomy syndrome.  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  She states that her gabapentin is helpful for this pain as well.  Continue to wean opiate to lowest dose possible.  Currently managing with a decreased dose of Percocet to 10 mg 2 times per day day as needed.  Flexeril remains helpful for muscle tightness and spasms which accompany her pain.  Plan reviewed with patient at today's office visit.    -Continue a decreased dose of Percocet 10 mg up to 2 times per day as needed for pain.  Patient feels that the medication continues to provide significant relief without adverse effects.  Advised patient that she may put aside controlled medications prescribed by this office and take a postoperative prescription from her surgeon after her lumbar surgery.  She may resume medication prescribed by this office after she has completed her postoperative prescription.  She verbalized understanding.  -Continue gabapentin 600 mg 3 times daily.  The patient was counseled on the  risks and potential side effects of gabapentin as well as its importance for dosage titration. Side effects included but were not limited to, drowsiness, sedation, cognitive decline, peripheral edema, weight gain, seizures, with abrupt withdrawal.  Patient was instructed to call the office with any concerns or side effects before abruptly stopping medication.   -Continue Flexeril for muscle tightness and spasm.    -Continue to follow-up with her surgeon at the [facility] for cervical postlaminectomy syndrome.  -Patient will follow-up in 3 months or sooner if needed.    MEDICAL DECISION MAKING:    My impressions and treatment recommendations were discussed in detail with the patient, who verbalized understanding and had no further questions prior to discharge. Given the patient's report of reduced pain and improved functional ability without adverse effects,  it is reasonable to continue her reduced dose of oxycodone 10 mg up to 2 times per day as needed for pain.  The terms of the opioid agreement as well as the potential risks and adverse effects of the patient's medication regimen were discussed in detail. This includes if applicable due to dosage of medication permission to discuss and coordinate care with other treatment providers relevant to the patients condition. The patient verbalized understanding.    Treatment goals were discussed in detail with the patient.  These goals include reduction of pain levels, improved levels of functioning, avoidance of medication side effect and lowest medication dose possible to achieve  these goals.  The patient was in full agreement with these goals.  Also discussed is the understanding that pain may not be eliminated by medication but that the goal of a better sustaining life through use of medication is appropriate.  Lifestyle modifications including weight management, stretching, diet, exercise and smoking are addressed at each office visit.  The patient provided a  urine drug screen for routine monitoring and compliance.  This test may be given as frequently as every month based on the patient's individual opiate risk stratification and prescriber concerns for any aberrancies.  This test is indicated given the use of controlled substances for the patient's medical condition.  Unless otherwise noted the prior (s) urine drug testing results were consistent with prescribed medications.  There is no evidence of illicit drug use or additional medications ingested.     Risks and side effects of chronic opioid therapy including but not limited to tolerance, dependence, constipation, hyperalgesia, cognitive side effects, addiction and possible death due to overuse and or misuse were discussed. I also discussed that such medications when co-administered with other sedative agents including but not limited to alcohol, benzodiazepines, sedative hypnotics and illegal drugs could pose life threatening consequences including death.  I also explained the impact that the administration of such medication has on a patient with obstructive sleep apnea and continued recommendations for use of apnea devices if ordered are prescribed by other physicians.  In order to effectively and safely treat the pain, I also emphasized the importance of compliance with the treatment plan as well as compliance with the terms of the opioid agreement which was reviewed in detail. I explained the importance of being responsible with the medications and to take these only as prescribed, never in excess and never for reasons other than pain reduction. The patient was counseled on keeping the medications safe and locked away from children and other adults as well as disposal methods and options. The patient understood the risks and instructions.      I also discussed with the patient in detail that based on the clinical response to the opioid medications and improvements of activities of daily living, sleep, and work  performance in light of compliance with the treatment plan we can continue this form of therapy for the above chronic  pain.  The goal and rationale used for current treatment with chronic opioid medication is to control the pain and alleviate disability induced by the chronic pain condition noted above after failures of other non-opioid and nonpharmacological modalities to treat the chronic pain and the symptoms associated with have failed.  The patient understood the goals in terms of the above treatment plan and had no further questions prior to leaving the office today.    Given the patient's total MED, general use of daily opiates, or other coadministered medications in various classes the patient was offered a prescription for Narcan.  I instructed the patient that Narcan is for emergency use only and is worse suspected or known opiate overdose.  Call 911 prior to administration of this medication to activate the emergency response team.  It is imperative to fill this medication in order to demonstrate understanding of the gravity of possible side effects including respiratory depression and risk of overdose of this opiate load or medication combination.  As such patients will be required to bring Narcan prescriptions to follow-up appointments as part of the compliance with continued opiate care.    Disclaimer: This note was transcribed using an audio transcription device.  As such, minor errors may be present with regard to spelling, punctuation, and inadvertent word insertion.  Please disregard such errors.

## 2024-12-16 NOTE — PROGRESS NOTES
Subjective   Patient ID: Breanne Obregon is a 49 y.o. female who presents for cervical radicular pain and lumbar radicular pain    HPI    49-year-old female with history of lumbar central canal stenosis resulting in lumbar radicular pain as well as cervical radicular pain presents for follow-up. She is followed by a surgeon at the [facility] for cervical postlaminectomy syndrome and continues to feel that the symptoms remain stable. Her lumbar radicular symptoms are most bothersome. Lumbar MRI consistent with multilevel degenerative changes with varying degrees of central canal stenosis.  She has done well with lumbar epidural steroid injections in the past.  Due to increasing low back symptoms as well as lower extremity weakness patient repeated her lumbar MRI and was evaluated in [facility] by Ortho/spine surgeon.  At that time recommended patient proceed with lumbar surgery however she had to decrease her hemoglobin A1c.  Presenting at today's office visit for routine follow-up.  Continues to experience cervical pain with radiation to her left upper extremity accompanied by numbness/tingling in her left hand.  She denies any increase in numbness/tingling or weakness in her upper extremities.  Denies any balance issues.  States that cervical symptoms remain stable.  She remains most bothered by her low back pain radiating to bilateral lower extremities.  Pain radiates laterally and posteriorly to her lower legs to the level of her feet.  Chronic numbness and tingling in bilateral lower extremities from her knees distally to her feet right greater than left.  Persistent weakness in bilateral lower extremities.  She denies any changes in bowel/bladder function or saddle anesthesia.  Continues to have phantom pain on her right foot where she had several toes amputated.  She describes her pain as aching, throbbing and can be sharp.  States that she cannot stand for even a short period of time  "without increasing pain.  Her pain from her low back is interrupting her sleep.  States that she has only been able to sleep for a few hours at a time.  Patient followed up with her Ortho/spine surgeon after successfully decreasing her hemoglobin A1c.  She is planning to schedule lumbar surgery for January 2025.  She continues to benefit from Percocet 10 mg which she takes 1-2 times per day as needed.  Also continues to benefit from gabapentin 600 mg 3 times daily and occasional Flexeril for muscle tightness and spasms.  She does feel that the medications are beneficial and that they decrease the intensity of her pain.    Location of Pain:  Patient returns to the office for re-evaluation of \"chronic lower back pain that radiates down the lateral and posterior side of bilateral legs stopping in the foot. Right side worse. Increased spasms down the right leg.  pt states she also has neck pain, described as spasms that radiates down the left arm into the hand causing pain and numbness. Three toes amputated causing foot pain.    Will have surgery with Dr. Cedeño in January-no scheduled date      Pain Score: 7/10     Treatment: Percocet  BID  Last dose: 12/16/24 1 dose and BID  Medication Count: 28 pills left in rx bottle  Fill date: 11/30/24      Efficacy: 95% relief for 8 hours     Side Effects: none     Narcan: 09/2025     Other pain medication/neuromodulator: Gabapentin- needs refill/Flexeril from PCP- [facility]     Injections and/or Procedures: LESI L4-L5 03/08/2024 with 65% relief                  OARRS:  No data recorded  I have personally reviewed the OARRS report for Breanne Obregon. I have considered the risks of abuse, dependence, addiction and diversion    Is the patient prescribed a combination of a benzodiazepine and opioid?  No    Last Urine Drug Screen / ordered today: Yes  Recent Results (from the past 8760 hours)   Confirmation Opiate/Opioid/Benzo Prescription Compliance    Collection " Time: 06/27/24  4:00 PM   Result Value Ref Range    Clonazepam <25 <25 ng/mL    7-Aminoclonazepam <25 <25 ng/mL    Alprazolam <25 <25 ng/mL    Alpha-Hydroxyalprazolam <25 <25 ng/mL    Midazolam <25 <25 ng/mL    Alpha-Hydroxymidazolam <25 <25 ng/mL    Chlordiazepoxide <25 <25 ng/mL    Diazepam <25 <25 ng/mL    Nordiazepam <25 <25 ng/mL    Temazepam <25 <25 ng/mL    Oxazepam <25 <25 ng/mL    Lorazepam <25 <25 ng/mL    Methadone <25 <25 ng/mL    EDDP <25 <25 ng/mL    6-Acetylmorphine <25 <25 ng/mL    Codeine <50 <50 ng/mL    Hydrocodone <25 <25 ng/mL    Hydromorphone <25 <25 ng/mL    Morphine  <50 <50 ng/mL    Norhydrocodone <25 <25 ng/mL    Noroxycodone 825 (H) <25 ng/mL    Oxycodone 685 (H) <25 ng/mL    Oxymorphone 1,218 (H) <25 ng/mL    Fentanyl <2.5 <2.5 ng/mL    Norfentanyl <2.5 <2.5 ng/mL    Tramadol <50 <50 ng/mL    O-Desmethyltramadol <50 <50 ng/mL    Zolpidem <25 <25 ng/mL    Zolpidem Metabolite (ZCA) <25 <25 ng/mL   Screen Opiate/Opioid/Benzo Prescription Compliance    Collection Time: 06/27/24  4:00 PM   Result Value Ref Range    Creatinine, Urine Random 96.8 20.0 - 320.0 mg/dL    Amphetamine Screen, Urine Presumptive Negative Presumptive Negative    Barbiturate Screen, Urine Presumptive Negative Presumptive Negative    Cannabinoid Screen, Urine Presumptive Negative Presumptive Negative    Cocaine Metabolite Screen, Urine Presumptive Negative Presumptive Negative    PCP Screen, Urine Presumptive Negative Presumptive Negative   Tapentadol Confirmation, Urine    Collection Time: 06/27/24  4:00 PM   Result Value Ref Range    Tapentadol <25 <25 ng/mL    N-Desmethyltapentadol <25 <25 ng/mL   Buprenorphine Confirm,Urine    Collection Time: 06/27/24  4:00 PM   Result Value Ref Range    BUPRENORPHINE GLUC, URINE <5 ng/mL    BUPRENORPHINE ,URINE <2 ng/mL    NALOXONE, URINE <100 ng/mL    NORBUPRENORPHINE GLUC,URINE <5 ng/mL    NORBUPRENORPHINE, URINE <2 ng/mL     Results are as expected.     Controlled Substance  Agreement:  Date of the Last Agreement: 9/25/24  Reviewed Controlled Substance Agreement including but not limited to the benefits, risks, and alternatives to treatment with a Controlled Substance medication(s).      Review of Systems      ROS:   General: No fevers, chills, weight loss  Skin: Negative for lesions  Eyes: No acute vision changes  Ears: No vertigo  Nose, mouth, throat: No difficulty swallowing or speaking  Respiratory: No cough, shortness of breath, cyanosis  Cardiovascular: Negative for chest pain syncope or palpitation  Gastrointestinal: No constipation, nausea, vomiting  Neurological: Negative for headache, positive for: Paresthesia and weakness  Psychological: Negative for severe or debilitating anxiety, depression. Negative memory loss  Musculoskeletal: Positive for arthralgia, myalgia, pain and spasm   Endocrine: Negative for weight gain, appetite changes, excessive sweating  Allergy/immune: Negative    All 13 systems were reviewed and are within normal levels except as noted or in the history of present illness.  Positive or pertinent negative responses are noted or were in the history of present illness. As noted, the patient denies significant or impairing weakness in the bilateral upper and lower extremities, medication induced constipation, and bowel or bladder incontinence.     Current Outpatient Medications   Medication Instructions    Accu-Chek Guide test strips strip TEST 3 TIMES A DAY AS DIRECTED    alcohol swabs (BD Alcohol Swabs) pads, medicated topical (top)    ARIPiprazole (Abilify) 10 mg tablet 1 tablet, oral, Daily    atorvastatin (LIPITOR) 10 mg, oral, Nightly    b complex vitamins (Vitamins B Complex) capsule Vitamin B Complex CAPS   Refills: 0       Active    cyclobenzaprine (FLEXERIL) 10 mg, oral, 3 times daily PRN    ferrous gluconate 324 (38 Fe) MG tablet 1 tablet, oral, Daily    gabapentin (NEURONTIN) 600 mg, oral, 3 times daily    glipiZIDE (Glucotrol) 10 mg tablet 1  tablet, oral, Every 12 hours    lamoTRIgine (LaMICtal) 100 mg tablet 1 tablet, oral, 2 times daily    levothyroxine (Synthroid, Levoxyl) 50 mcg tablet     lidocaine (Lidoderm) 5 % patch 1 patch, transdermal, Daily, Remove & discard patch within 12 hours or as directed by MD.    metFORMIN (Glucophage) 1,000 mg tablet 1 tablet, oral, 2 times daily    Mounjaro 2.5 mg, subcutaneous, Every 7 days    oxyCODONE-acetaminophen (Percocet)  mg tablet 1 tablet, oral, 2 times daily PRN    pantoprazole (ProtoNix) 40 mg EC tablet 1 tablet, oral, Every 12 hours    pyridoxine HCl, vitamin B6, (VITAMIN B-6 ORAL) Vitamin B-6 TABS   Refills: 0       Active    scopolamine (Transderm-Scop) 1 mg over 3 days patch 3 day 1 patch, transdermal, Every 72 hours, For motion sickness        Past Medical History:   Diagnosis Date    Personal history of other diseases of the musculoskeletal system and connective tissue     History of arthritis    Personal history of other mental and behavioral disorders     History of depression    Personal history of other mental and behavioral disorders     History of anxiety    Presence of spectacles and contact lenses     Wears glasses        Past Surgical History:   Procedure Laterality Date    OTHER SURGICAL HISTORY  2019    Gallbladder surgery    OTHER SURGICAL HISTORY  2019     section    OTHER SURGICAL HISTORY  2019    Toe amputation    OTHER SURGICAL HISTORY  2019    Thyroid surgery        Family History   Problem Relation Name Age of Onset    No Known Problems Other          Allergies   Allergen Reactions    Aspirin GI Upset    Morphine Unknown    Other Unknown     Nonsteroidal Anti-Inflammatory Agents        No results found for this or any previous visit from the past 1000 days.      Objective     There were no vitals filed for this visit.            Physical Exam    GENERAL EXAM  Vital Signs: Vital signs to include heart rate, respiration rate, blood pressure, and  temperature were reviewed.  General Appearance:  Awake, alert, healthy appearing, well developed, No acute distress.  Head: Normocephalic without evidence of head injury.  Neck: The appearance of the neck was normal without swelling with a midline trachea.  Eyes: The eyelids and eyebrows exhibited no abnormalities.  Pupils were not pin-point.  Sclera was without icterus.  Lungs: Respiration rhythm and depth was normal.  Respiratory movements were normal without labored breathing.  Cardiovascular: No peripheral edema was present.    Spine, cervical: Tenderness to paraspinous musculature paracervical region bilaterally.  Flexion intact with extension more limited and increasing pain radiating to bilateral shoulders and left upper extremity.  Spine, lumbar: Patient is able to rise from seated to standing position with some hesitancy.  Gait is slow and deliberate, slightly forward leaning.  Tenderness to paraspinous musculature lower lumbar region bilaterally.  Flexion and extension are both limited secondary to pain.  Positive straight leg raise right lower extremity.  Neurological: Patient was oriented to time, place, and person.  Speech was normal.  Equal strength bilateral upper extremities 5/5.  Decreased strength bilateral lower extremities 4/5. Diminished sensation to light touch from her knees distally to feet bilaterally.  Psychiatric: Appearance was normal with appropriate dress.  Mood was euthymic and affect was normal.  Skin: Affected regions were without ecchymosis or skin lesions.          Assessment/Plan   Problem List Items Addressed This Visit             ICD-10-CM    Cervical neuropathy G54.2    Cervical postlaminectomy syndrome M96.1    Relevant Medications    gabapentin (Neurontin) 600 mg tablet    oxyCODONE-acetaminophen (Percocet)  mg tablet (Start on 12/30/2024)    Lumbar radiculopathy M54.16    Relevant Medications    oxyCODONE-acetaminophen (Percocet)  mg tablet (Start on 12/30/2024)     Peripheral neuropathy G62.9    Long term prescription opiate use - Primary Z79.891    Relevant Orders    Buprenorphine Confirm,Urine    Tapentadol Confirmation, Urine    Alcohol, Urine    Opiate/Opioid/Benzo Prescription Compliance    OOB Internal Tracking    Lumbar stenosis with neurogenic claudication M48.062     49-year-old female with history of cervical postlaminectomy syndrome as well as lumbar symptoms consistent with lumbar stenosis with neurogenic claudication presenting for follow-up.  Patient has done well with lumbar epidural steroid injection L4-5 receiving sustained relief.  Previously completed formal physical therapy and does home exercises.  Presents at today's office visit with an persistent increase in lumbar radicular symptoms accompanied by increasing weakness right lower extremity.  Denies any changes in bowel/bladder function or saddle anesthesia.  Evaluated by a surgeon at [facility] in [place] with recommendation to proceed with lumbar surgery.  Patient successfully reduced her hemoglobin A1c and is planning lumbar surgery for January 2025. Persistent cervical pain unchanged from previous exam.  Continues to be followed by a surgeon at the [facility] for cervical postlaminectomy syndrome.  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  She states that her gabapentin is helpful for this pain as well.  Continue to wean opiate to lowest dose possible.  Currently managing with a decreased dose of Percocet to 10 mg 2 times per day day as needed.  Flexeril remains helpful for muscle tightness and spasms which accompany her pain.  Plan reviewed with patient at today's office visit.    -Continue a decreased dose of Percocet 10 mg up to 2 times per day as needed for pain.  Patient feels that the medication continues to provide significant relief without adverse effects.  Advised patient that she may put aside controlled medications  prescribed by this office and take a postoperative prescription from her surgeon after her lumbar surgery.  She may resume medication prescribed by this office after she has completed her postoperative prescription.  She verbalized understanding.  -Continue gabapentin 600 mg 3 times daily.  The patient was counseled on the risks and potential side effects of gabapentin as well as its importance for dosage titration. Side effects included but were not limited to, drowsiness, sedation, cognitive decline, peripheral edema, weight gain, seizures, with abrupt withdrawal.  Patient was instructed to call the office with any concerns or side effects before abruptly stopping medication.   -Continue Flexeril for muscle tightness and spasm.    -Continue to follow-up with her surgeon at the [facility] for cervical postlaminectomy syndrome.  -Patient will follow-up in 3 months or sooner if needed.    MEDICAL DECISION MAKING:    My impressions and treatment recommendations were discussed in detail with the patient, who verbalized understanding and had no further questions prior to discharge. Given the patient's report of reduced pain and improved functional ability without adverse effects,  it is reasonable to continue her reduced dose of oxycodone 10 mg up to 2 times per day as needed for pain.  The terms of the opioid agreement as well as the potential risks and adverse effects of the patient's medication regimen were discussed in detail. This includes if applicable due to dosage of medication permission to discuss and coordinate care with other treatment providers relevant to the patients condition. The patient verbalized understanding.    Treatment goals were discussed in detail with the patient.  These goals include reduction of pain levels, improved levels of functioning, avoidance of medication side effect and lowest medication dose possible to achieve  these goals.  The patient was in full agreement with these  goals.  Also discussed is the understanding that pain may not be eliminated by medication but that the goal of a better sustaining life through use of medication is appropriate.  Lifestyle modifications including weight management, stretching, diet, exercise and smoking are addressed at each office visit.  The patient provided a urine drug screen for routine monitoring and compliance.  This test may be given as frequently as every month based on the patient's individual opiate risk stratification and prescriber concerns for any aberrancies.  This test is indicated given the use of controlled substances for the patient's medical condition.  Unless otherwise noted the prior (s) urine drug testing results were consistent with prescribed medications.  There is no evidence of illicit drug use or additional medications ingested.     Risks and side effects of chronic opioid therapy including but not limited to tolerance, dependence, constipation, hyperalgesia, cognitive side effects, addiction and possible death due to overuse and or misuse were discussed. I also discussed that such medications when co-administered with other sedative agents including but not limited to alcohol, benzodiazepines, sedative hypnotics and illegal drugs could pose life threatening consequences including death.  I also explained the impact that the administration of such medication has on a patient with obstructive sleep apnea and continued recommendations for use of apnea devices if ordered are prescribed by other physicians.  In order to effectively and safely treat the pain, I also emphasized the importance of compliance with the treatment plan as well as compliance with the terms of the opioid agreement which was reviewed in detail. I explained the importance of being responsible with the medications and to take these only as prescribed, never in excess and never for reasons other than pain reduction. The patient was counseled on keeping the  medications safe and locked away from children and other adults as well as disposal methods and options. The patient understood the risks and instructions.      I also discussed with the patient in detail that based on the clinical response to the opioid medications and improvements of activities of daily living, sleep, and work performance in light of compliance with the treatment plan we can continue this form of therapy for the above chronic  pain.  The goal and rationale used for current treatment with chronic opioid medication is to control the pain and alleviate disability induced by the chronic pain condition noted above after failures of other non-opioid and nonpharmacological modalities to treat the chronic pain and the symptoms associated with have failed.  The patient understood the goals in terms of the above treatment plan and had no further questions prior to leaving the office today.    Given the patient's total MED, general use of daily opiates, or other coadministered medications in various classes the patient was offered a prescription for Narcan.  I instructed the patient that Narcan is for emergency use only and is worse suspected or known opiate overdose.  Call 911 prior to administration of this medication to activate the emergency response team.  It is imperative to fill this medication in order to demonstrate understanding of the gravity of possible side effects including respiratory depression and risk of overdose of this opiate load or medication combination.  As such patients will be required to bring Narcan prescriptions to follow-up appointments as part of the compliance with continued opiate care.    Disclaimer: This note was transcribed using an audio transcription device.  As such, minor errors may be present with regard to spelling, punctuation, and inadvertent word insertion.  Please disregard such errors.                Relevant Medications    gabapentin (Neurontin) 600 mg tablet     oxyCODONE-acetaminophen (Percocet)  mg tablet (Start on 12/30/2024)              This note was generated with the aid of dictation software, there may be typos despite my attempts at proofreading.

## 2024-12-18 LAB
1OH-MIDAZOLAM UR CFM-MCNC: <25 NG/ML
6MAM UR CFM-MCNC: <25 NG/ML
7AMINOCLONAZEPAM UR CFM-MCNC: <25 NG/ML
A-OH ALPRAZ UR CFM-MCNC: <25 NG/ML
ALPRAZ UR CFM-MCNC: <25 NG/ML
CHLORDIAZEP UR CFM-MCNC: <25 NG/ML
CLONAZEPAM UR CFM-MCNC: <25 NG/ML
CODEINE UR CFM-MCNC: <50 NG/ML
DIAZEPAM UR CFM-MCNC: <25 NG/ML
EDDP UR CFM-MCNC: <25 NG/ML
FENTANYL UR CFM-MCNC: <2.5 NG/ML
HYDROCODONE CTO UR CFM-MCNC: <25 NG/ML
HYDROMORPHONE UR CFM-MCNC: <25 NG/ML
LORAZEPAM UR CFM-MCNC: <25 NG/ML
METHADONE UR CFM-MCNC: <25 NG/ML
MIDAZOLAM UR CFM-MCNC: <25 NG/ML
MORPHINE UR CFM-MCNC: <50 NG/ML
NORDIAZEPAM UR CFM-MCNC: <25 NG/ML
NORFENTANYL UR CFM-MCNC: <2.5 NG/ML
NORHYDROCODONE UR CFM-MCNC: <25 NG/ML
NOROXYCODONE UR CFM-MCNC: >1000 NG/ML
NORTAPENTADOL UR CFM-MCNC: <25 NG/ML
NORTRAMADOL UR-MCNC: <50 NG/ML
OXAZEPAM UR CFM-MCNC: <25 NG/ML
OXYCODONE UR CFM-MCNC: >2500 NG/ML
OXYMORPHONE UR CFM-MCNC: >2500 NG/ML
TAPENTADOL UR CFM-MCNC: <25 NG/ML
TEMAZEPAM UR CFM-MCNC: <25 NG/ML
TRAMADOL UR CFM-MCNC: <50 NG/ML
ZOLPIDEM UR CFM-MCNC: <25 NG/ML
ZOLPIDEM UR-MCNC: <25 NG/ML

## 2025-01-20 DIAGNOSIS — M96.1 CERVICAL POSTLAMINECTOMY SYNDROME: ICD-10-CM

## 2025-01-20 DIAGNOSIS — M48.062 LUMBAR STENOSIS WITH NEUROGENIC CLAUDICATION: ICD-10-CM

## 2025-01-20 DIAGNOSIS — M54.16 LUMBAR RADICULOPATHY: ICD-10-CM

## 2025-01-20 NOTE — TELEPHONE ENCOUNTER
Pt is requesting refill of   Percocet  mg 1 tablet po BID [place]av[facility]                                                         LV:  12/16/24                  NV:  3/24/25                OARRS reviewed with LFD:    12/30/24 #60/30 days                        Pended RX to SHARA Chand for transmission to pharmacy.

## 2025-01-21 RX ORDER — OXYCODONE AND ACETAMINOPHEN 10; 325 MG/1; MG/1
1 TABLET ORAL 2 TIMES DAILY PRN
Qty: 60 TABLET | Refills: 0 | Status: SHIPPED | OUTPATIENT
Start: 2025-01-29 | End: 2025-02-28

## 2025-02-19 DIAGNOSIS — M54.16 LUMBAR RADICULOPATHY: ICD-10-CM

## 2025-02-19 DIAGNOSIS — M48.062 LUMBAR STENOSIS WITH NEUROGENIC CLAUDICATION: ICD-10-CM

## 2025-02-19 DIAGNOSIS — M96.1 CERVICAL POSTLAMINECTOMY SYNDROME: ICD-10-CM

## 2025-02-19 RX ORDER — OXYCODONE AND ACETAMINOPHEN 10; 325 MG/1; MG/1
1 TABLET ORAL 2 TIMES DAILY PRN
Qty: 60 TABLET | Refills: 0 | Status: SHIPPED | OUTPATIENT
Start: 2025-02-28 | End: 2025-03-30

## 2025-02-19 NOTE — TELEPHONE ENCOUNTER
Okay to continue Percocet 10 mg up to 2 times per day as needed for pain with start date 2/28/2025.

## 2025-02-19 NOTE — TELEPHONE ENCOUNTER
Pt is requesting refill of  Percocet  mg 1 tablet po BID [facility]                                                         LV:   12/16/24                 NV:    3/24/25              OARRS reviewed with LFD:   1/29/25 #60/30 days                         Pended RX to SHARA Chand for transmission to pharmacy.

## 2025-03-24 ENCOUNTER — OFFICE VISIT (OUTPATIENT)
Dept: PAIN MEDICINE | Facility: HOSPITAL | Age: 50
End: 2025-03-24
Payer: MEDICARE

## 2025-03-24 VITALS
DIASTOLIC BLOOD PRESSURE: 74 MMHG | RESPIRATION RATE: 18 BRPM | BODY MASS INDEX: 36.57 KG/M2 | HEART RATE: 103 BPM | HEIGHT: 65 IN | OXYGEN SATURATION: 99 % | WEIGHT: 219.5 LBS | SYSTOLIC BLOOD PRESSURE: 112 MMHG

## 2025-03-24 DIAGNOSIS — M54.16 LUMBAR RADICULOPATHY: ICD-10-CM

## 2025-03-24 DIAGNOSIS — M48.062 LUMBAR STENOSIS WITH NEUROGENIC CLAUDICATION: ICD-10-CM

## 2025-03-24 DIAGNOSIS — M54.12 CERVICAL RADICULOPATHY AT C6: Primary | ICD-10-CM

## 2025-03-24 DIAGNOSIS — M96.1 CERVICAL POSTLAMINECTOMY SYNDROME: ICD-10-CM

## 2025-03-24 PROCEDURE — 3008F BODY MASS INDEX DOCD: CPT | Performed by: CLINICAL NURSE SPECIALIST

## 2025-03-24 PROCEDURE — G2211 COMPLEX E/M VISIT ADD ON: HCPCS | Performed by: CLINICAL NURSE SPECIALIST

## 2025-03-24 PROCEDURE — 99214 OFFICE O/P EST MOD 30 MIN: CPT | Performed by: CLINICAL NURSE SPECIALIST

## 2025-03-24 PROCEDURE — 1036F TOBACCO NON-USER: CPT | Performed by: CLINICAL NURSE SPECIALIST

## 2025-03-24 RX ORDER — GABAPENTIN 600 MG/1
600 TABLET ORAL 3 TIMES DAILY
Qty: 90 TABLET | Refills: 5 | Status: SHIPPED | OUTPATIENT
Start: 2025-03-24 | End: 2025-04-23

## 2025-03-24 RX ORDER — AMLODIPINE AND OLMESARTAN MEDOXOMIL 5; 20 MG/1; MG/1
1 TABLET ORAL DAILY
COMMUNITY

## 2025-03-24 RX ORDER — OXYCODONE AND ACETAMINOPHEN 10; 325 MG/1; MG/1
1 TABLET ORAL 2 TIMES DAILY PRN
Qty: 60 TABLET | Refills: 0 | Status: SHIPPED | OUTPATIENT
Start: 2025-03-30 | End: 2025-04-29

## 2025-03-24 ASSESSMENT — PAIN SCALES - GENERAL: PAINLEVEL_OUTOF10: 7

## 2025-03-24 ASSESSMENT — PATIENT HEALTH QUESTIONNAIRE - PHQ9
2. FEELING DOWN, DEPRESSED OR HOPELESS: NOT AT ALL
SUM OF ALL RESPONSES TO PHQ9 QUESTIONS 1 AND 2: 0
1. LITTLE INTEREST OR PLEASURE IN DOING THINGS: NOT AT ALL

## 2025-03-24 ASSESSMENT — ENCOUNTER SYMPTOMS
OCCASIONAL FEELINGS OF UNSTEADINESS: 1
LOSS OF SENSATION IN FEET: 1
DEPRESSION: 0

## 2025-03-24 NOTE — PROGRESS NOTES
Follow Up Visit    Location of Pain: Back and neck pain. Low back pain, radiates down right leg to foot. States has numbness, tingling. Due to have back surgery next month. Neck pain radiate down left arm to fingers. Has numbness, tingling. Resting helps pain         Pain Score: 7/10    Treatment:  Percocet  mg  Last dose: today  Medication Count:  12  Fill date: 2/28/25    Efficacy: 85%    Side Effects: none    Narcan: Exp 9/25    Other pain medication/neuromodulator: gabapentin- needs refill, lidocaine patch    Therapeutic Goals:    Injections and/or Procedures: ALINE 3/8/24    Other: ice pack prn    Genetic Swab:  Urine Screen: 12/16/24  Narcotics Agreement: 9/25/24  ORT: 9/25/24  PDUQ: 12/16/24  OA: 6/27/24      Subjective   Patient ID: Breanne Obregon is a 50 y.o. female who presents for cervical radicular lumbar radicular pain   HPI    50-year-old female with history of lumbar stenosis resulting in lumbar radicular pain as well as cervical radicular pain presents for follow-up.  The patient is followed by a surgeon at the [facility] for cervical postlaminectomy syndrome and continues to feel that symptoms remain stable.  Most bothered by her lumbar radicular symptoms.  Lumbar MRI consistent with multilevel degenerative changes with varying degrees of central canal stenosis.  Previously did well with epidural steroid injections.  Injections last their efficacy and patient returned to a surgeon.  She repeated her lumbar MRI and was evaluated by a surgeon in [place] at [facility].  At that time recommended patient proceed with lumbar surgery, however, she had to decrease her hemoglobin A1c.  She has been working hard on decreasing her A1c.  Presenting at today's office visit for routine follow-up.  Continues to experience cervical pain with radiation to her left upper extremity accompanied by numbness and tingling in her left hand.  Denies any increase in symptoms or balance issues.  Denies any  falls.  She feels that her cervical symptoms remain stable.  Most bothered by lumbar radicular pain.  Pain radiates laterally and posteriorly to her lower legs to the level of her feet right greater than left.  Chronic numbness and tingling in bilateral lower extremities from her knees distally to her feet right greater than left.  Weakness in bilateral lower extremities unchanged from previous exam.  She denies any changes in bowel/bladder function or saddle anesthesia.  She also continues to experience phantom pain in her right foot where she had 3 toes amputated.  She has noted increasing pain in the fifth toe of her right foot.  She is scheduled to follow-up with podiatry.  Pain is aching, throbbing and can be sharp.  Pain interferes with her ability to be active as well as to sleep.  She is planning lumbar surgery, however, she was hospitalized with tachycardia in December and started on new medication per cardiology.  She has a follow-up appointment with cardiology this week and is hoping to receive cardiac clearance to proceed with lumbar surgery.  She has decreased her hemoglobin A1c to 7.4.  She is hopeful for lumbar surgery in April or May.  Continues to manage her pain with Percocet 10 mg up to 2 times per day as needed, gabapentin 600 mg 3 times daily and lidocaine patches.  She states while she was hospitalized they rotated her from Flexeril to tizanidine for muscle tightness and spasms.  Currently tolerating tizanidine without adverse effects.  She does feel that the medication keeps  her pain tolerable.    OARRS:  Tiffany Iqbal, APRN-CNP, APRN-CNS on 3/24/2025  8:55 AM  I have personally reviewed the OARRS report for Breanne SUDHIR FloresSabas. I have considered the risks of abuse, dependence, addiction and diversion    Is the patient prescribed a combination of a benzodiazepine and opioid?  No    Last Urine Drug Screen / ordered today: No  Recent Results (from the past 8760 hours)   Confirmation  Opiate/Opioid/Benzo Prescription Compliance    Collection Time: 12/16/24  8:49 AM   Result Value Ref Range    Clonazepam <25 <25 ng/mL    7-Aminoclonazepam <25 <25 ng/mL    Alprazolam <25 <25 ng/mL    Alpha-Hydroxyalprazolam <25 <25 ng/mL    Midazolam <25 <25 ng/mL    Alpha-Hydroxymidazolam <25 <25 ng/mL    Chlordiazepoxide <25 <25 ng/mL    Diazepam <25 <25 ng/mL    Nordiazepam <25 <25 ng/mL    Temazepam <25 <25 ng/mL    Oxazepam <25 <25 ng/mL    Lorazepam <25 <25 ng/mL    Methadone <25 <25 ng/mL    EDDP <25 <25 ng/mL    6-Acetylmorphine <25 <25 ng/mL    Codeine <50 <50 ng/mL    Hydrocodone <25 <25 ng/mL    Hydromorphone <25 <25 ng/mL    Morphine  <50 <50 ng/mL    Norhydrocodone <25 <25 ng/mL    Noroxycodone >1,000 (H) <25 ng/mL    Oxycodone >2,500 (H) <25 ng/mL    Oxymorphone >2,500 (H) <25 ng/mL    Fentanyl <2.5 <2.5 ng/mL    Norfentanyl <2.5 <2.5 ng/mL    Tramadol <50 <50 ng/mL    O-Desmethyltramadol <50 <50 ng/mL    Zolpidem <25 <25 ng/mL    Zolpidem Metabolite (ZCA) <25 <25 ng/mL   Screen Opiate/Opioid/Benzo Prescription Compliance    Collection Time: 12/16/24  8:49 AM   Result Value Ref Range    Creatinine, Urine Random 263.8 20.0 - 320.0 mg/dL    Amphetamine Screen, Urine Presumptive Negative Presumptive Negative    Barbiturate Screen, Urine Presumptive Negative Presumptive Negative    Cannabinoid Screen, Urine Presumptive Negative Presumptive Negative    Cocaine Metabolite Screen, Urine Presumptive Negative Presumptive Negative    PCP Screen, Urine Presumptive Negative Presumptive Negative   Tapentadol Confirmation, Urine    Collection Time: 12/16/24  8:49 AM   Result Value Ref Range    Tapentadol <25 <25 ng/mL    N-Desmethyltapentadol <25 <25 ng/mL   Buprenorphine Confirm,Urine    Collection Time: 12/16/24  8:49 AM   Result Value Ref Range    BUPRENORPHINE GLUC, URINE <5 ng/mL    BUPRENORPHINE ,URINE <2 ng/mL    NALOXONE, URINE <100 ng/mL    NORBUPRENORPHINE GLUC,URINE <5 ng/mL    NORBUPRENORPHINE, URINE <2  ng/mL     Results are as expected.     Controlled Substance Agreement:  Date of the Last Agreement:  9/25/24      Monitoring and compliance:    ORT: 9/25/24    PDUQ: 12/16/24    Office Agreement: 6/27/24      Review of Systems    ROS:   General: No fevers, chills, weight loss  Skin: Negative for lesions  Eyes: No acute vision changes  Ears: No vertigo  Nose, mouth, throat: No difficulty swallowing or speaking  Respiratory: No cough, shortness of breath, cyanosis  Cardiovascular: Negative for chest pain syncope or palpitation  Gastrointestinal: No constipation, nausea, vomiting  Neurological: Negative for headache, positive for: Chronic paresthesia and weakness  Psychological: Negative for severe or debilitating anxiety, depression. Negative memory loss  Musculoskeletal: Positive for arthralgia, myalgia, pain and spasm  Endocrine: Negative for weight gain, appetite changes, excessive sweating  Allergy/immune: Negative    All 13 systems were reviewed and are within normal levels except as noted or in the history of present illness.  Positive or pertinent negative responses are noted or were in the history of present illness. As noted, the patient denies significant or impairing weakness in the bilateral upper and lower extremities, medication induced constipation, and bowel or bladder incontinence.     Current Outpatient Medications:     Accu-Chek Guide test strips strip, TEST 3 TIMES A DAY AS DIRECTED, Disp: , Rfl:     alcohol swabs (BD Alcohol Swabs) pads, medicated, Apply topically., Disp: , Rfl:     amlodipine-olmesartan (Alison) 5-20 mg tablet, Take 1 tablet by mouth once daily., Disp: , Rfl:     ARIPiprazole (Abilify) 10 mg tablet, Take 1 tablet (10 mg) by mouth once daily., Disp: , Rfl:     atorvastatin (Lipitor) 10 mg tablet, Take 1 tablet (10 mg) by mouth once daily at bedtime., Disp: , Rfl:     b complex vitamins (Vitamins B Complex) capsule, Vitamin B Complex CAPS  Refills: 0     Active, Disp: , Rfl:      ferrous gluconate 324 (38 Fe) MG tablet, Take 1 tablet (324 mg) by mouth once daily., Disp: , Rfl:     lamoTRIgine (LaMICtal) 100 mg tablet, Take 1 tablet (100 mg) by mouth 2 times a day., Disp: , Rfl:     levothyroxine (Synthroid, Levoxyl) 50 mcg tablet, , Disp: , Rfl:     lidocaine (Lidoderm) 5 % patch, Place 1 patch on the skin once daily. Remove & discard patch within 12 hours or as directed by MD., Disp: , Rfl:     metFORMIN (Glucophage) 1,000 mg tablet, Take 1 tablet (1,000 mg) by mouth 2 times a day., Disp: , Rfl:     pantoprazole (ProtoNix) 40 mg EC tablet, Take 1 tablet (40 mg) by mouth every 12 hours., Disp: , Rfl:     pyridoxine HCl, vitamin B6, (VITAMIN B-6 ORAL), Vitamin B-6 TABS  Refills: 0     Active, Disp: , Rfl:     scopolamine (Transderm-Scop) 1 mg over 3 days patch 3 day, Place 1 patch on the skin every 3rd day. For motion sickness, Disp: , Rfl:     cyclobenzaprine (Flexeril) 10 mg tablet, Take 1 tablet (10 mg) by mouth 3 times a day as needed for muscle spasms. (Patient not taking: Reported on 3/24/2025), Disp: , Rfl:     gabapentin (Neurontin) 600 mg tablet, Take 1 tablet (600 mg) by mouth 3 times a day., Disp: 90 tablet, Rfl: 5    glipiZIDE (Glucotrol) 10 mg tablet, Take 1 tablet (10 mg) by mouth every 12 hours. (Patient not taking: Reported on 3/24/2025), Disp: , Rfl:     [START ON 3/30/2025] oxyCODONE-acetaminophen (Percocet)  mg tablet, Take 1 tablet by mouth 2 times a day as needed for severe pain (7 - 10). Do not fill before March 30, 2025., Disp: 60 tablet, Rfl: 0    tirzepatide (Mounjaro) 2.5 mg/0.5 mL pen injector, Inject 5 mg under the skin every 7 days., Disp: , Rfl:      Past Medical History:   Diagnosis Date    Personal history of other diseases of the musculoskeletal system and connective tissue     History of arthritis    Personal history of other mental and behavioral disorders     History of depression    Personal history of other mental and behavioral disorders      "History of anxiety    Presence of spectacles and contact lenses     Wears glasses        Past Surgical History:   Procedure Laterality Date    OTHER SURGICAL HISTORY  2019    Gallbladder surgery    OTHER SURGICAL HISTORY  2019     section    OTHER SURGICAL HISTORY  2019    Toe amputation    OTHER SURGICAL HISTORY  2019    Thyroid surgery        Family History   Problem Relation Name Age of Onset    No Known Problems Other          Allergies   Allergen Reactions    Aspirin GI Upset    Morphine Unknown    Other Unknown     Nonsteroidal Anti-Inflammatory Agents        Objective     Visit Vitals  /74   Pulse 103   Resp 18   Ht 1.651 m (5' 5\")   Wt 99.6 kg (219 lb 8 oz)   SpO2 99%   BMI 36.53 kg/m²   OB Status Hysterectomy   Smoking Status Never   BSA 2.14 m²        Physical Exam    PE:  General: Well-developed, well-nourished, no acute distress. The patient demonstrates no pain behavior, symptom magnification or overt drug-seeking behavior.  Eye: Pupils appropriate for room lighting  Neck/thyroid: No obvious goiter or enlargement of neck noted  Respiratory exam: Normal respiratory effort, unlabored respiration. No accessory muscle use noted  Cardiac exam: Bilateral radial pulses intact  Abdominal: Nondistended  Spine, cervical: Tenderness to paraspinous musculature paracervical region bilaterally.  Flexion intact with extension more limited and increasing pain across her shoulders.  Rotational twisting intact.  Spine, lumbar: The patient is able to rise from a seated to standing position without hesitancy, push off, or delay. Gait is slow, deliberate and slightly forward leaning. Tenderness to paraspinous musculature is noted lower lumbar region bilaterally.  Flexion and extension limited.  Positive straight leg raise right lower extremity.  Neurologic exam: Muscle strength is antigravity in all 4 extremities.  Equal strength bilateral upper extremities 5/5.  Decree strength " bilateral lower extremities 4/5.  Diminished sensation to light touch from her knees distally to her feet bilaterally.  Amputation of second third and fourth toe right foot.  Psychiatric exam: Judgment and insight normal, affect normal, speech is fluent, affect appropriate, demonstrating no signs of hypersomnolence, sedation, or confusion        Assessment/Plan   Problem List Items Addressed This Visit             ICD-10-CM    Cervical postlaminectomy syndrome M96.1    Relevant Medications    oxyCODONE-acetaminophen (Percocet)  mg tablet (Start on 3/30/2025)    gabapentin (Neurontin) 600 mg tablet    Lumbar radiculopathy M54.16    Relevant Medications    oxyCODONE-acetaminophen (Percocet)  mg tablet (Start on 3/30/2025)    Cervical radiculopathy at C6 - Primary M54.12    Lumbar stenosis with neurogenic claudication M48.062     50-year-old female with history of cervical postlaminectomy syndrome as well as lumbar symptoms consistent with lumbar stenosis with neurogenic claudication presenting for follow-up.  Patient has done well with lumbar epidural steroid injections which eventually lost efficacy.  She noted increase in lumbar symptoms and proceeded with surgical evaluation of lumbar symptoms with recommendation to proceed with lumbar surgery.  Working on decreasing her A1c before proceeding with surgery.  She previously completed formal physical therapy and does home exercises.  Presents at today's office visit with persistent lumbar radicular symptoms accompanied by weakness right lower extremity.  Chronic neuropathy bilateral hands and feet.  Continues to experience chronic cervical pain radiating to her left upper extremity accompanied by numbness and tingling in her left hand.  She feels her cervical symptoms are stable.  She is waiting to proceed with lumbar surgery until she is able to have cardiac clearance for recent hospitalization for tachycardia.  Patient will be evaluated by her  cardiologist this week and is hopeful to proceed with lumbar surgery in April or May. 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  She is experiencing an increase in pain to her right fifth toe and is scheduled to see podiatry for evaluation.  Continued benefit with her current pain regimen including Percocet 10 mg up to 2 times per day and gabapentin 600 mg 3 times per day.  She states that while hospitalized she was rotated from Flexeril to tizanidine and feels this has added additional relief of muscle tightness and cramping most noted in her lower extremities.  Continue benefit with lidocaine patches most beneficial for low back symptoms.  Plan reviewed with patient at today's office visit.    -Continue a decreased dose of Percocet 10 mg up to 2 times per day as needed for pain.  Patient feels that the medication continues to provide significant relief without adverse effects.  Advised patient that she may put aside controlled medications prescribed by this office and take a postoperative prescription from her surgeon after her lumbar surgery.  She may resume medication prescribed by this office after she has completed her postoperative prescription.  She verbalized understanding.  -Continue gabapentin 600 mg 3 times daily.  The patient was counseled on the risks and potential side effects of gabapentin as well as its importance for dosage titration. Side effects included but were not limited to, drowsiness, sedation, cognitive decline, peripheral edema, weight gain, seizures, with abrupt withdrawal.  Patient was instructed to call the office with any concerns or side effects before abruptly stopping medication.   -Continue tizanidine for muscle tightness and spasms.      -Continue to follow-up with her surgeon at the [facility] for cervical postlaminectomy syndrome.  -Patient will follow-up in 3 months or sooner if needed.    MEDICAL DECISION  MAKING:    My impressions and treatment recommendations were discussed in detail with the patient, who verbalized understanding and had no further questions prior to discharge. Given the patient's report of reduced pain and improved functional ability without adverse effects,  it is reasonable to continue her reduced dose of oxycodone 10 mg up to 2 times per day as needed for pain.  The terms of the opioid agreement as well as the potential risks and adverse effects of the patient's medication regimen were discussed in detail. This includes if applicable due to dosage of medication permission to discuss and coordinate care with other treatment providers relevant to the patients condition. The patient verbalized understanding.    Treatment goals were discussed in detail with the patient.  These goals include reduction of pain levels, improved levels of functioning, avoidance of medication side effect and lowest medication dose possible to achieve  these goals.  The patient was in full agreement with these goals.  Also discussed is the understanding that pain may not be eliminated by medication but that the goal of a better sustaining life through use of medication is appropriate.  Lifestyle modifications including weight management, stretching, diet, exercise and smoking are addressed at each office visit.  The patient provided a urine drug screen for routine monitoring and compliance.  This test may be given as frequently as every month based on the patient's individual opiate risk stratification and prescriber concerns for any aberrancies.  This test is indicated given the use of controlled substances for the patient's medical condition.  Unless otherwise noted the prior (s) urine drug testing results were consistent with prescribed medications.  There is no evidence of illicit drug use or additional medications ingested.     Risks and side effects of chronic opioid therapy including but not limited to tolerance,  dependence, constipation, hyperalgesia, cognitive side effects, addiction and possible death due to overuse and or misuse were discussed. I also discussed that such medications when co-administered with other sedative agents including but not limited to alcohol, benzodiazepines, sedative hypnotics and illegal drugs could pose life threatening consequences including death.  I also explained the impact that the administration of such medication has on a patient with obstructive sleep apnea and continued recommendations for use of apnea devices if ordered are prescribed by other physicians.  In order to effectively and safely treat the pain, I also emphasized the importance of compliance with the treatment plan as well as compliance with the terms of the opioid agreement which was reviewed in detail. I explained the importance of being responsible with the medications and to take these only as prescribed, never in excess and never for reasons other than pain reduction. The patient was counseled on keeping the medications safe and locked away from children and other adults as well as disposal methods and options. The patient understood the risks and instructions.      I also discussed with the patient in detail that based on the clinical response to the opioid medications and improvements of activities of daily living, sleep, and work performance in light of compliance with the treatment plan we can continue this form of therapy for the above chronic  pain.  The goal and rationale used for current treatment with chronic opioid medication is to control the pain and alleviate disability induced by the chronic pain condition noted above after failures of other non-opioid and nonpharmacological modalities to treat the chronic pain and the symptoms associated with have failed.  The patient understood the goals in terms of the above treatment plan and had no further questions prior to leaving the office today.    Given the  patient's total MED, general use of daily opiates, or other coadministered medications in various classes the patient was offered a prescription for Narcan.  I instructed the patient that Narcan is for emergency use only and is worse suspected or known opiate overdose.  Call 911 prior to administration of this medication to activate the emergency response team.  It is imperative to fill this medication in order to demonstrate understanding of the gravity of possible side effects including respiratory depression and risk of overdose of this opiate load or medication combination.  As such patients will be required to bring Narcan prescriptions to follow-up appointments as part of the compliance with continued opiate care.                  Relevant Medications    oxyCODONE-acetaminophen (Percocet)  mg tablet (Start on 3/30/2025)    gabapentin (Neurontin) 600 mg tablet

## 2025-03-24 NOTE — ASSESSMENT & PLAN NOTE
50-year-old female with history of cervical postlaminectomy syndrome as well as lumbar symptoms consistent with lumbar stenosis with neurogenic claudication presenting for follow-up.  Patient has done well with lumbar epidural steroid injections which eventually lost efficacy.  She noted increase in lumbar symptoms and proceeded with surgical evaluation of lumbar symptoms with recommendation to proceed with lumbar surgery.  Working on decreasing her A1c before proceeding with surgery.  She previously completed formal physical therapy and does home exercises.  Presents at today's office visit with persistent lumbar radicular symptoms accompanied by weakness right lower extremity.  Chronic neuropathy bilateral hands and feet.  Continues to experience chronic cervical pain radiating to her left upper extremity accompanied by numbness and tingling in her left hand.  She feels her cervical symptoms are stable.  She is waiting to proceed with lumbar surgery until she is able to have cardiac clearance for recent hospitalization for tachycardia.  Patient will be evaluated by her cardiologist this week and is hopeful to proceed with lumbar surgery in April or May. 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  She is experiencing an increase in pain to her right fifth toe and is scheduled to see podiatry for evaluation.  Continued benefit with her current pain regimen including Percocet 10 mg up to 2 times per day and gabapentin 600 mg 3 times per day.  She states that while hospitalized she was rotated from Flexeril to tizanidine and feels this has added additional relief of muscle tightness and cramping most noted in her lower extremities.  Continue benefit with lidocaine patches most beneficial for low back symptoms.  Plan reviewed with patient at today's office visit.    -Continue a decreased dose of Percocet 10 mg up to 2 times per day as needed for pain.  Patient  feels that the medication continues to provide significant relief without adverse effects.  Advised patient that she may put aside controlled medications prescribed by this office and take a postoperative prescription from her surgeon after her lumbar surgery.  She may resume medication prescribed by this office after she has completed her postoperative prescription.  She verbalized understanding.  -Continue gabapentin 600 mg 3 times daily.  The patient was counseled on the risks and potential side effects of gabapentin as well as its importance for dosage titration. Side effects included but were not limited to, drowsiness, sedation, cognitive decline, peripheral edema, weight gain, seizures, with abrupt withdrawal.  Patient was instructed to call the office with any concerns or side effects before abruptly stopping medication.   -Continue tizanidine for muscle tightness and spasms.      -Continue to follow-up with her surgeon at the [facility] for cervical postlaminectomy syndrome.  -Patient will follow-up in 3 months or sooner if needed.    MEDICAL DECISION MAKING:    My impressions and treatment recommendations were discussed in detail with the patient, who verbalized understanding and had no further questions prior to discharge. Given the patient's report of reduced pain and improved functional ability without adverse effects,  it is reasonable to continue her reduced dose of oxycodone 10 mg up to 2 times per day as needed for pain.  The terms of the opioid agreement as well as the potential risks and adverse effects of the patient's medication regimen were discussed in detail. This includes if applicable due to dosage of medication permission to discuss and coordinate care with other treatment providers relevant to the patients condition. The patient verbalized understanding.    Treatment goals were discussed in detail with the patient.  These goals include reduction of pain levels, improved levels of  functioning, avoidance of medication side effect and lowest medication dose possible to achieve  these goals.  The patient was in full agreement with these goals.  Also discussed is the understanding that pain may not be eliminated by medication but that the goal of a better sustaining life through use of medication is appropriate.  Lifestyle modifications including weight management, stretching, diet, exercise and smoking are addressed at each office visit.  The patient provided a urine drug screen for routine monitoring and compliance.  This test may be given as frequently as every month based on the patient's individual opiate risk stratification and prescriber concerns for any aberrancies.  This test is indicated given the use of controlled substances for the patient's medical condition.  Unless otherwise noted the prior (s) urine drug testing results were consistent with prescribed medications.  There is no evidence of illicit drug use or additional medications ingested.     Risks and side effects of chronic opioid therapy including but not limited to tolerance, dependence, constipation, hyperalgesia, cognitive side effects, addiction and possible death due to overuse and or misuse were discussed. I also discussed that such medications when co-administered with other sedative agents including but not limited to alcohol, benzodiazepines, sedative hypnotics and illegal drugs could pose life threatening consequences including death.  I also explained the impact that the administration of such medication has on a patient with obstructive sleep apnea and continued recommendations for use of apnea devices if ordered are prescribed by other physicians.  In order to effectively and safely treat the pain, I also emphasized the importance of compliance with the treatment plan as well as compliance with the terms of the opioid agreement which was reviewed in detail. I explained the importance of being responsible with the  medications and to take these only as prescribed, never in excess and never for reasons other than pain reduction. The patient was counseled on keeping the medications safe and locked away from children and other adults as well as disposal methods and options. The patient understood the risks and instructions.      I also discussed with the patient in detail that based on the clinical response to the opioid medications and improvements of activities of daily living, sleep, and work performance in light of compliance with the treatment plan we can continue this form of therapy for the above chronic  pain.  The goal and rationale used for current treatment with chronic opioid medication is to control the pain and alleviate disability induced by the chronic pain condition noted above after failures of other non-opioid and nonpharmacological modalities to treat the chronic pain and the symptoms associated with have failed.  The patient understood the goals in terms of the above treatment plan and had no further questions prior to leaving the office today.    Given the patient's total MED, general use of daily opiates, or other coadministered medications in various classes the patient was offered a prescription for Narcan.  I instructed the patient that Narcan is for emergency use only and is worse suspected or known opiate overdose.  Call 911 prior to administration of this medication to activate the emergency response team.  It is imperative to fill this medication in order to demonstrate understanding of the gravity of possible side effects including respiratory depression and risk of overdose of this opiate load or medication combination.  As such patients will be required to bring Narcan prescriptions to follow-up appointments as part of the compliance with continued opiate care.

## 2025-04-22 DIAGNOSIS — M48.062 LUMBAR STENOSIS WITH NEUROGENIC CLAUDICATION: ICD-10-CM

## 2025-04-22 DIAGNOSIS — M96.1 CERVICAL POSTLAMINECTOMY SYNDROME: ICD-10-CM

## 2025-04-22 DIAGNOSIS — M54.16 LUMBAR RADICULOPATHY: ICD-10-CM

## 2025-04-22 RX ORDER — OXYCODONE AND ACETAMINOPHEN 10; 325 MG/1; MG/1
1 TABLET ORAL 2 TIMES DAILY PRN
Qty: 60 TABLET | Refills: 0 | Status: SHIPPED | OUTPATIENT
Start: 2025-04-29 | End: 2025-05-29

## 2025-04-22 NOTE — TELEPHONE ENCOUNTER
Pt is requesting refill of   Percocet  mg 1 tablet po BID [facility]                                                        LV:   3/24/25                 NV:  6/19/25                OARRS reviewed with LFD:     3/30/25 #60/30 days                       Pended RX to SHARA Chand for transmission to pharmacy.

## 2025-04-25 ENCOUNTER — TELEPHONE (OUTPATIENT)
Dept: PAIN MEDICINE | Facility: HOSPITAL | Age: 50
End: 2025-04-25
Payer: MEDICARE

## 2025-04-25 NOTE — TELEPHONE ENCOUNTER
[facility] calling to advise script for pain meds come through today in regards to patient had surgery today.. advised pharmacy patient can take new script but would have to stop the pain meds she receives from us

## 2025-05-22 ENCOUNTER — TELEPHONE (OUTPATIENT)
Dept: PAIN MEDICINE | Facility: HOSPITAL | Age: 50
End: 2025-05-22
Payer: MEDICARE

## 2025-05-22 NOTE — TELEPHONE ENCOUNTER
Pt called for a refill of her Percocet  mg 1 tablet po BID, however, pt had surgery and according to OARRS her RX from us was 4/29/25 - 5/29/25 and she filled Oxycodone 5 mg #56/7 days 5/2/25, 5/9/25 and 5/16/25, therefore, her next start date for medication would be 6/18/25.  Advised pt to call back 6/12/25 for this refill.  Pt verbalizes understanding and compliance.  Jackie Glover RN

## 2025-06-13 DIAGNOSIS — M96.1 CERVICAL POSTLAMINECTOMY SYNDROME: Primary | ICD-10-CM

## 2025-06-13 DIAGNOSIS — M48.062 LUMBAR STENOSIS WITH NEUROGENIC CLAUDICATION: ICD-10-CM

## 2025-06-13 RX ORDER — OXYCODONE AND ACETAMINOPHEN 10; 325 MG/1; MG/1
1 TABLET ORAL 2 TIMES DAILY PRN
Qty: 60 TABLET | Refills: 0 | Status: SHIPPED | OUTPATIENT
Start: 2025-06-18 | End: 2025-07-18

## 2025-06-13 RX ORDER — OXYCODONE AND ACETAMINOPHEN 10; 325 MG/1; MG/1
1 TABLET ORAL 2 TIMES DAILY PRN
COMMUNITY
End: 2025-06-13 | Stop reason: SDUPTHER

## 2025-06-13 NOTE — TELEPHONE ENCOUNTER
Requested refill for Percocet 10/325mg BID #60 for 30 days. Per Jackie's last telephone note she had several post op scripts and was to call back now for a start date of 6/18/25.  LV: 3/24/25  NV: 6/19/25  OARRS LFD: 4/29/25-for our medication  [facility]

## 2025-06-19 ENCOUNTER — OFFICE VISIT (OUTPATIENT)
Dept: PAIN MEDICINE | Facility: HOSPITAL | Age: 50
End: 2025-06-19
Payer: MEDICARE

## 2025-06-19 VITALS
HEIGHT: 65 IN | WEIGHT: 218 LBS | RESPIRATION RATE: 18 BRPM | HEART RATE: 84 BPM | BODY MASS INDEX: 36.32 KG/M2 | SYSTOLIC BLOOD PRESSURE: 141 MMHG | OXYGEN SATURATION: 99 % | DIASTOLIC BLOOD PRESSURE: 84 MMHG

## 2025-06-19 DIAGNOSIS — M79.18 MYOFASCIAL PAIN: ICD-10-CM

## 2025-06-19 DIAGNOSIS — G62.89 OTHER POLYNEUROPATHY: ICD-10-CM

## 2025-06-19 DIAGNOSIS — M96.1 CERVICAL POSTLAMINECTOMY SYNDROME: ICD-10-CM

## 2025-06-19 DIAGNOSIS — M54.16 LUMBAR RADICULOPATHY: ICD-10-CM

## 2025-06-19 DIAGNOSIS — Z79.891 LONG TERM PRESCRIPTION OPIATE USE: Primary | ICD-10-CM

## 2025-06-19 PROCEDURE — 1036F TOBACCO NON-USER: CPT | Performed by: CLINICAL NURSE SPECIALIST

## 2025-06-19 PROCEDURE — 99214 OFFICE O/P EST MOD 30 MIN: CPT | Performed by: CLINICAL NURSE SPECIALIST

## 2025-06-19 PROCEDURE — 3008F BODY MASS INDEX DOCD: CPT | Performed by: CLINICAL NURSE SPECIALIST

## 2025-06-19 PROCEDURE — G2211 COMPLEX E/M VISIT ADD ON: HCPCS | Performed by: CLINICAL NURSE SPECIALIST

## 2025-06-19 RX ORDER — TIZANIDINE HYDROCHLORIDE 2 MG/1
2 CAPSULE, GELATIN COATED ORAL 3 TIMES DAILY
COMMUNITY

## 2025-06-19 RX ORDER — ERGOCALCIFEROL 1.25 MG/1
1.25 CAPSULE ORAL WEEKLY
COMMUNITY

## 2025-06-19 ASSESSMENT — ENCOUNTER SYMPTOMS
DEPRESSION: 0
OCCASIONAL FEELINGS OF UNSTEADINESS: 1
LOSS OF SENSATION IN FEET: 0

## 2025-06-19 ASSESSMENT — PAIN SCALES - GENERAL: PAINLEVEL_OUTOF10: 7

## 2025-06-19 NOTE — PROGRESS NOTES
Follow Up Visit    Location of Pain: Neck pain, back pack-surgery was 4/24/25, feeling much better. Neck pain continues, movement makes worse, lying flat on back makes worse.          Pain Score: 7/10-neck, back 5/10    Treatment: Percocet  mg  BID  Last dose: 6/18/25  Medication Count:  58 tabs  Fill date: 6/18/25    Efficacy: 95%    Side Effects: none    Narcan: Exp: 9/25    Other pain medication/neuromodulator: lidocaine patches to neck, tizanidine 2 mg TID, gabapentin    Therapeutic Goals:    Injections and/or Procedures: ALINE 3/8/24    Other: to be starting PT post back surgery    Genetic Swab:  Urine Screen: 6/19/25  Narcotics Agreement: 9/25/24  ORT: 9/25/24  PDUQ: 6/19/25 Score 9  OA: 6/19/25    Subjective   Patient ID: Breanne Obregon is a 50 y.o. female who presents for cervical and lumbar radicular pain.  HPI  50-year-old female with history of lumbar stenosis resulting in lumbar radicular pain as well as cervical radicular pain presents for follow-up.  She is followed by a surgeon at the [facility] for cervical postlaminectomy syndrome.  She has been most bothered by her lumbar symptoms.  Lumbar MRI was consistent with multilevel degenerative changes with varying degrees of central canal stenosis.  Lumbar epidural steroid injections last their efficacy and patient proceeded to be evaluated by a surgeon.  Recommended lumbar surgery which the patient was scheduled for in April 2025.  Presenting at today's office visit for routine follow-up.  Continues to experience cervical pain which radiates most often to her left upper extremity accompanied by numbness and tingling in her left hand.  She has chronic weakness in that left hand.  Denies any balance issues or recent falls.  Cervical pain increases with movement of her head/rotation as well as with laying flat.  She proceeded with lumbar surgery in April 2025 and is currently recovering.  She developed a postop infection which has postponed her  postoperative physical therapy.  States that she will be starting postoperative physical therapy after her next follow-up visit with her surgeon.  Improvement in cramping in her lower extremities as well as numbness/tingling. Currently experiencing numbness and tingling only in her feet.  She has also noticed improvement in the strength of her lower extremities.  Denies any issues with bowel/bladder function.  She states that she has been able to stand for longer including cooking with less pain after her surgery.  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  Her pain remains aching and throbbing.  Pain accompanied by muscle tightness and spasms most noted in her cervical region.  Her surgeon added tizanidine which has been helpful for muscle tightness and spasms.  Continue benefit with Percocet 10 mg taking up to 2 times per day as needed.  She did take a postoperative prescription from her surgeon and put aside opiates prescribed by our office.  Continuing to tolerate gabapentin 600 mg 3 times daily.  Will supplement with lidocaine patches.  She remains on Mounjaro and is doing a good job with weight loss.  OARRS:  No data recorded  I have personally reviewed the OARRS report for Breanne Obregon. I have considered the risks of abuse, dependence, addiction and diversion    Is the patient prescribed a combination of a benzodiazepine and opioid?  No    Last Urine Drug Screen / ordered today: Yes  Recent Results (from the past 8760 hours)   Confirmation Opiate/Opioid/Benzo Prescription Compliance    Collection Time: 12/16/24  8:49 AM   Result Value Ref Range    Clonazepam <25 <25 ng/mL    7-Aminoclonazepam <25 <25 ng/mL    Alprazolam <25 <25 ng/mL    Alpha-Hydroxyalprazolam <25 <25 ng/mL    Midazolam <25 <25 ng/mL    Alpha-Hydroxymidazolam <25 <25 ng/mL    Chlordiazepoxide <25 <25 ng/mL    Diazepam <25 <25 ng/mL    Nordiazepam <25 <25 ng/mL    Temazepam <25 <25 ng/mL    Oxazepam <25 <25  ng/mL    Lorazepam <25 <25 ng/mL    Methadone <25 <25 ng/mL    EDDP <25 <25 ng/mL    6-Acetylmorphine <25 <25 ng/mL    Codeine <50 <50 ng/mL    Hydrocodone <25 <25 ng/mL    Hydromorphone <25 <25 ng/mL    Morphine  <50 <50 ng/mL    Norhydrocodone <25 <25 ng/mL    Noroxycodone >1,000 (H) <25 ng/mL    Oxycodone >2,500 (H) <25 ng/mL    Oxymorphone >2,500 (H) <25 ng/mL    Fentanyl <2.5 <2.5 ng/mL    Norfentanyl <2.5 <2.5 ng/mL    Tramadol <50 <50 ng/mL    O-Desmethyltramadol <50 <50 ng/mL    Zolpidem <25 <25 ng/mL    Zolpidem Metabolite (ZCA) <25 <25 ng/mL   Screen Opiate/Opioid/Benzo Prescription Compliance    Collection Time: 12/16/24  8:49 AM   Result Value Ref Range    Creatinine, Urine Random 263.8 20.0 - 320.0 mg/dL    Amphetamine Screen, Urine Presumptive Negative Presumptive Negative    Barbiturate Screen, Urine Presumptive Negative Presumptive Negative    Cannabinoid Screen, Urine Presumptive Negative Presumptive Negative    Cocaine Metabolite Screen, Urine Presumptive Negative Presumptive Negative    PCP Screen, Urine Presumptive Negative Presumptive Negative   Tapentadol Confirmation, Urine    Collection Time: 12/16/24  8:49 AM   Result Value Ref Range    Tapentadol <25 <25 ng/mL    N-Desmethyltapentadol <25 <25 ng/mL   Buprenorphine Confirm,Urine    Collection Time: 12/16/24  8:49 AM   Result Value Ref Range    BUPRENORPHINE GLUC, URINE <5 ng/mL    BUPRENORPHINE ,URINE <2 ng/mL    NALOXONE, URINE <100 ng/mL    NORBUPRENORPHINE GLUC,URINE <5 ng/mL    NORBUPRENORPHINE, URINE <2 ng/mL     Results are as expected.     Controlled Substance Agreement:  Date of the Last Agreement:  9/25/24  Reviewed Controlled Substance Agreement including but not limited to the benefits, risks, and alternatives to treatment with a Controlled Substance medication(s).    Monitoring and compliance:    ORT: 9/25/25    PDUQ: 6/19/25 Score 9    Office Agreement: 6/19/25      Review of Systems    ROS:   General: No fevers, chills, weight  loss  Skin: Negative for lesions  Eyes: No acute vision changes  Ears: No vertigo  Nose, mouth, throat: No difficulty swallowing or speaking  Respiratory: No cough, shortness of breath, cyanosis  Cardiovascular: Negative for chest pain syncope or palpitation  Gastrointestinal: No constipation, nausea, vomiting  Neurological: Negative for headache, positive for: Chronic paresthesia,weakness  Psychological: Negative for severe or debilitating anxiety, depression. Negative memory loss  Musculoskeletal: Positive for arthralgia, myalgia, pain and spasm  Endocrine: Negative for weight gain, appetite changes, excessive sweating  Allergy/immune: Negative    All 13 systems were reviewed and are within normal levels except as noted or in the history of present illness.  Positive or pertinent negative responses are noted or were in the history of present illness. As noted, the patient denies significant or impairing weakness in the bilateral upper and lower extremities, medication induced constipation, and bowel or bladder incontinence.   Current Medications[1]     Medical History[2]     Surgical History[3]     Family History[4]     RX Allergies[5]     Objective     Visit Vitals  OB Status Hysterectomy   Smoking Status Never        Physical Exam    PE:  General: Well-developed, well-nourished, no acute distress. The patient demonstrates no pain behavior, symptom magnification or overt drug-seeking behavior.  Eye: Pupils appropriate for room lighting  Neck/thyroid: No obvious goiter or enlargement of neck noted  Respiratory exam: Normal respiratory effort, unlabored respiration. No accessory muscle use noted  Cardiac exam: Bilateral radial pulses intact  Abdominal: Nondistended  Spine, cervical: Tenderness to paraspinous musculature paracervical region bilaterally.  Myofascial tenderness and muscle tightness upper trapezius muscles left greater than right.  Flexion intact with extension more limited and increasing pain.   Rotational twisting increasing pain.   Spine, lumbar: The patient is able to rise from a seated to standing position without hesitancy, push off, or delay. Gait is slow, deliberate.  Ambulates with assistance of a cane.  Healing surgical incision.    Neurologic exam: Muscle strength is antigravity in all 4 extremities.  Equal strength bilateral upper extremities 5/5.  Decreased strength bilateral lower extremities 4/5.  Psychiatric exam: Judgment and insight normal, affect normal, speech is fluent, affect appropriate, demonstrating no signs of hypersomnolence, sedation, or confusion        Assessment/Plan   Problem List Items Addressed This Visit           ICD-10-CM    Cervical postlaminectomy syndrome - Primary M96.1    50-year-old female with history of cervical postlaminectomy syndrome as well as lumbar symptoms consistent with lumbar stenosis with neurogenic claudication presenting for follow-up.  Patient initially did well with lumbar epidural steroid injections which eventually lost efficacy.  She noted a significant increase in lumbar symptoms and proceeded with surgical evaluation of lumbar symptoms with recommendation to proceed with lumbar surgery.  Presenting at today's office visit for routine follow-up and medication check.  Patient proceeded with lumbar surgery in April 2025 and is recovering nicely.  Initial postop infection delayed her postoperative physical therapy.  Completed her antibiotics with resolution of her infection.  She will start postoperative physical therapy after her next postop visit.  She has been doing home exercises and stretches directed at her cervical symptoms which appear most bothersome today.  Encouraged the patient to continue home therapy exercises and stretches for her cervical symptoms consistently for optimal results.  Continues to do a good job with weight loss.  She has noted some muscle tightness in her upper trapezius muscles.  She feels that the addition of  tizanidine has been beneficial and is tolerating without adverse effects.  Continued benefit from gabapentin 600 mg 3 times daily and Percocet 10 mg up to 2 times per day as needed.  She will supplement with lidocaine patches which remain beneficial.  She is planning to follow-up with her surgeon at the [facility] for further discussion about treatment options for cervical postlaminectomy syndrome when she has recovered from her lumbar surgery.   Plan reviewed with patient at today's office visit.    -Continue a decreased dose of Percocet 10 mg up to 2 times per day as needed for pain.  Patient feels that the medication continues to provide significant relief without adverse effects.    -Continue gabapentin 600 mg 3 times daily.  The patient was counseled on the risks and potential side effects of gabapentin as well as its importance for dosage titration. Side effects included but were not limited to, drowsiness, sedation, cognitive decline, peripheral edema, weight gain, seizures, with abrupt withdrawal.  Patient was instructed to call the office with any concerns or side effects before abruptly stopping medication.   -Continue tizanidine for muscle tightness and spasms.      -Continue Home therapy exercises and stretches targeting cervical symptoms.  She is planning to start a formal course of postoperative physical therapy after her next postop visit.  - Recommend follow-up with her surgeon at the [facility] for cervical postlaminectomy syndrome when she has recovered from her lumbar surgery.  -Patient will follow-up in 3 months or sooner if needed.    MEDICAL DECISION MAKING:    My impressions and treatment recommendations were discussed in detail with the patient, who verbalized understanding and had no further questions prior to discharge. Given the patient's report of reduced pain and improved functional ability without adverse effects,  it is reasonable to continue her reduced dose of oxycodone  10 mg up to 2 times per day as needed for pain.  The terms of the opioid agreement as well as the potential risks and adverse effects of the patient's medication regimen were discussed in detail. This includes if applicable due to dosage of medication permission to discuss and coordinate care with other treatment providers relevant to the patients condition. The patient verbalized understanding.    Treatment goals were discussed in detail with the patient.  These goals include reduction of pain levels, improved levels of functioning, avoidance of medication side effect and lowest medication dose possible to achieve  these goals.  The patient was in full agreement with these goals.  Also discussed is the understanding that pain may not be eliminated by medication but that the goal of a better sustaining life through use of medication is appropriate.  Lifestyle modifications including weight management, stretching, diet, exercise and smoking are addressed at each office visit.  The patient provided a urine drug screen for routine monitoring and compliance.  This test may be given as frequently as every month based on the patient's individual opiate risk stratification and prescriber concerns for any aberrancies.  This test is indicated given the use of controlled substances for the patient's medical condition.  Unless otherwise noted the prior (s) urine drug testing results were consistent with prescribed medications.  There is no evidence of illicit drug use or additional medications ingested.     Risks and side effects of chronic opioid therapy including but not limited to tolerance, dependence, constipation, hyperalgesia, cognitive side effects, addiction and possible death due to overuse and or misuse were discussed. I also discussed that such medications when co-administered with other sedative agents including but not limited to alcohol, benzodiazepines, sedative hypnotics and illegal drugs could pose life  threatening consequences including death.  I also explained the impact that the administration of such medication has on a patient with obstructive sleep apnea and continued recommendations for use of apnea devices if ordered are prescribed by other physicians.  In order to effectively and safely treat the pain, I also emphasized the importance of compliance with the treatment plan as well as compliance with the terms of the opioid agreement which was reviewed in detail. I explained the importance of being responsible with the medications and to take these only as prescribed, never in excess and never for reasons other than pain reduction. The patient was counseled on keeping the medications safe and locked away from children and other adults as well as disposal methods and options. The patient understood the risks and instructions.      I also discussed with the patient in detail that based on the clinical response to the opioid medications and improvements of activities of daily living, sleep, and work performance in light of compliance with the treatment plan we can continue this form of therapy for the above chronic  pain.  The goal and rationale used for current treatment with chronic opioid medication is to control the pain and alleviate disability induced by the chronic pain condition noted above after failures of other non-opioid and nonpharmacological modalities to treat the chronic pain and the symptoms associated with have failed.  The patient understood the goals in terms of the above treatment plan and had no further questions prior to leaving the office today.    Given the patient's total MED, general use of daily opiates, or other coadministered medications in various classes the patient was offered a prescription for Narcan.  I instructed the patient that Narcan is for emergency use only and is worse suspected or known opiate overdose.  Call 911 prior to administration of this medication to activate  the emergency response team.  It is imperative to fill this medication in order to demonstrate understanding of the gravity of possible side effects including respiratory depression and risk of overdose of this opiate load or medication combination.  As such patients will be required to bring Narcan prescriptions to follow-up appointments as part of the compliance with continued opiate care.                  Lumbar radiculopathy M54.16    Myofascial pain M79.18    Peripheral neuropathy G62.9            [1]   Current Outpatient Medications:     Accu-Chek Guide test strips strip, TEST 3 TIMES A DAY AS DIRECTED, Disp: , Rfl:     alcohol swabs (BD Alcohol Swabs) pads, medicated, Apply topically., Disp: , Rfl:     amlodipine-olmesartan (Alison) 5-20 mg tablet, Take 1 tablet by mouth once daily., Disp: , Rfl:     ARIPiprazole (Abilify) 10 mg tablet, Take 1 tablet (10 mg) by mouth once daily., Disp: , Rfl:     atorvastatin (Lipitor) 10 mg tablet, Take 1 tablet (10 mg) by mouth once daily at bedtime., Disp: , Rfl:     b complex vitamins (Vitamins B Complex) capsule, Vitamin B Complex CAPS  Refills: 0     Active, Disp: , Rfl:     cyclobenzaprine (Flexeril) 10 mg tablet, Take 1 tablet (10 mg) by mouth 3 times a day as needed for muscle spasms. (Patient not taking: Reported on 3/24/2025), Disp: , Rfl:     ferrous gluconate 324 (38 Fe) MG tablet, Take 1 tablet (324 mg) by mouth once daily., Disp: , Rfl:     gabapentin (Neurontin) 600 mg tablet, Take 1 tablet (600 mg) by mouth 3 times a day., Disp: 90 tablet, Rfl: 5    glipiZIDE (Glucotrol) 10 mg tablet, Take 1 tablet (10 mg) by mouth every 12 hours. (Patient not taking: Reported on 3/24/2025), Disp: , Rfl:     lamoTRIgine (LaMICtal) 100 mg tablet, Take 1 tablet (100 mg) by mouth 2 times a day., Disp: , Rfl:     levothyroxine (Synthroid, Levoxyl) 50 mcg tablet, , Disp: , Rfl:     lidocaine (Lidoderm) 5 % patch, Place 1 patch on the skin once daily. Remove & discard patch within 12  hours or as directed by MD., Disp: , Rfl:     metFORMIN (Glucophage) 1,000 mg tablet, Take 1 tablet (1,000 mg) by mouth 2 times a day., Disp: , Rfl:     oxyCODONE-acetaminophen (Percocet)  mg tablet, Take 1 tablet by mouth 2 times a day as needed for severe pain (7 - 10). Do not fill before 2025., Disp: 60 tablet, Rfl: 0    pantoprazole (ProtoNix) 40 mg EC tablet, Take 1 tablet (40 mg) by mouth every 12 hours., Disp: , Rfl:     pyridoxine HCl, vitamin B6, (VITAMIN B-6 ORAL), Vitamin B-6 TABS  Refills: 0     Active, Disp: , Rfl:     scopolamine (Transderm-Scop) 1 mg over 3 days patch 3 day, Place 1 patch on the skin every 3rd day. For motion sickness, Disp: , Rfl:     tirzepatide (Mounjaro) 2.5 mg/0.5 mL pen injector, Inject 5 mg under the skin every 7 days., Disp: , Rfl:   [2]   Past Medical History:  Diagnosis Date    Personal history of other diseases of the musculoskeletal system and connective tissue     History of arthritis    Personal history of other mental and behavioral disorders     History of depression    Personal history of other mental and behavioral disorders     History of anxiety    Presence of spectacles and contact lenses     Wears glasses   [3]   Past Surgical History:  Procedure Laterality Date    OTHER SURGICAL HISTORY  2019    Gallbladder surgery    OTHER SURGICAL HISTORY  2019     section    OTHER SURGICAL HISTORY  2019    Toe amputation    OTHER SURGICAL HISTORY  2019    Thyroid surgery   [4]   Family History  Problem Relation Name Age of Onset    No Known Problems Other     [5]   Allergies  Allergen Reactions    Aspirin GI Upset    Morphine Unknown    Other Unknown     Nonsteroidal Anti-Inflammatory Agents

## 2025-06-19 NOTE — ASSESSMENT & PLAN NOTE
50-year-old female with history of cervical postlaminectomy syndrome as well as lumbar symptoms consistent with lumbar stenosis with neurogenic claudication presenting for follow-up.  Patient initially did well with lumbar epidural steroid injections which eventually lost efficacy.  She noted a significant increase in lumbar symptoms and proceeded with surgical evaluation of lumbar symptoms with recommendation to proceed with lumbar surgery.  Presenting at today's office visit for routine follow-up and medication check.  Patient proceeded with lumbar surgery in April 2025 and is recovering nicely.  Initial postop infection delayed her postoperative physical therapy.  Completed her antibiotics with resolution of her infection.  She will start postoperative physical therapy after her next postop visit.  She has been doing home exercises and stretches directed at her cervical symptoms which appear most bothersome today.  Encouraged the patient to continue home therapy exercises and stretches for her cervical symptoms consistently for optimal results.  Continues to do a good job with weight loss.  She has noted some muscle tightness in her upper trapezius muscles.  She feels that the addition of tizanidine has been beneficial and is tolerating without adverse effects.  Continued benefit from gabapentin 600 mg 3 times daily and Percocet 10 mg up to 2 times per day as needed.  She will supplement with lidocaine patches which remain beneficial.  She is planning to follow-up with her surgeon at the [facility] for further discussion about treatment options for cervical postlaminectomy syndrome when she has recovered from her lumbar surgery.   Plan reviewed with patient at today's office visit.    -Continue a decreased dose of Percocet 10 mg up to 2 times per day as needed for pain.  Patient feels that the medication continues to provide significant relief without adverse effects.    -Continue gabapentin 600 mg 3 times  daily.  The patient was counseled on the risks and potential side effects of gabapentin as well as its importance for dosage titration. Side effects included but were not limited to, drowsiness, sedation, cognitive decline, peripheral edema, weight gain, seizures, with abrupt withdrawal.  Patient was instructed to call the office with any concerns or side effects before abruptly stopping medication.   -Continue tizanidine for muscle tightness and spasms.      -Continue Home therapy exercises and stretches targeting cervical symptoms.  She is planning to start a formal course of postoperative physical therapy after her next postop visit.  - Recommend follow-up with her surgeon at the [facility] for cervical postlaminectomy syndrome when she has recovered from her lumbar surgery.  -Patient will follow-up in 3 months or sooner if needed.    MEDICAL DECISION MAKING:    My impressions and treatment recommendations were discussed in detail with the patient, who verbalized understanding and had no further questions prior to discharge. Given the patient's report of reduced pain and improved functional ability without adverse effects,  it is reasonable to continue her reduced dose of oxycodone 10 mg up to 2 times per day as needed for pain.  The terms of the opioid agreement as well as the potential risks and adverse effects of the patient's medication regimen were discussed in detail. This includes if applicable due to dosage of medication permission to discuss and coordinate care with other treatment providers relevant to the patients condition. The patient verbalized understanding.    Treatment goals were discussed in detail with the patient.  These goals include reduction of pain levels, improved levels of functioning, avoidance of medication side effect and lowest medication dose possible to achieve  these goals.  The patient was in full agreement with these goals.  Also discussed is the understanding that pain  may not be eliminated by medication but that the goal of a better sustaining life through use of medication is appropriate.  Lifestyle modifications including weight management, stretching, diet, exercise and smoking are addressed at each office visit.  The patient provided a urine drug screen for routine monitoring and compliance.  This test may be given as frequently as every month based on the patient's individual opiate risk stratification and prescriber concerns for any aberrancies.  This test is indicated given the use of controlled substances for the patient's medical condition.  Unless otherwise noted the prior (s) urine drug testing results were consistent with prescribed medications.  There is no evidence of illicit drug use or additional medications ingested.     Risks and side effects of chronic opioid therapy including but not limited to tolerance, dependence, constipation, hyperalgesia, cognitive side effects, addiction and possible death due to overuse and or misuse were discussed. I also discussed that such medications when co-administered with other sedative agents including but not limited to alcohol, benzodiazepines, sedative hypnotics and illegal drugs could pose life threatening consequences including death.  I also explained the impact that the administration of such medication has on a patient with obstructive sleep apnea and continued recommendations for use of apnea devices if ordered are prescribed by other physicians.  In order to effectively and safely treat the pain, I also emphasized the importance of compliance with the treatment plan as well as compliance with the terms of the opioid agreement which was reviewed in detail. I explained the importance of being responsible with the medications and to take these only as prescribed, never in excess and never for reasons other than pain reduction. The patient was counseled on keeping the medications safe and locked away from children and  other adults as well as disposal methods and options. The patient understood the risks and instructions.      I also discussed with the patient in detail that based on the clinical response to the opioid medications and improvements of activities of daily living, sleep, and work performance in light of compliance with the treatment plan we can continue this form of therapy for the above chronic  pain.  The goal and rationale used for current treatment with chronic opioid medication is to control the pain and alleviate disability induced by the chronic pain condition noted above after failures of other non-opioid and nonpharmacological modalities to treat the chronic pain and the symptoms associated with have failed.  The patient understood the goals in terms of the above treatment plan and had no further questions prior to leaving the office today.    Given the patient's total MED, general use of daily opiates, or other coadministered medications in various classes the patient was offered a prescription for Narcan.  I instructed the patient that Narcan is for emergency use only and is worse suspected or known opiate overdose.  Call 911 prior to administration of this medication to activate the emergency response team.  It is imperative to fill this medication in order to demonstrate understanding of the gravity of possible side effects including respiratory depression and risk of overdose of this opiate load or medication combination.  As such patients will be required to bring Narcan prescriptions to follow-up appointments as part of the compliance with continued opiate care.

## 2025-06-22 LAB
1OH-MIDAZOLAM UR-MCNC: NEGATIVE NG/ML
7AMINOCLONAZEPAM UR-MCNC: NEGATIVE NG/ML
A-OH ALPRAZ UR-MCNC: NEGATIVE NG/ML
A-OH-TRIAZOLAM UR-MCNC: NEGATIVE NG/ML
AMPHETAMINES UR QL: NEGATIVE NG/ML
BARBITURATES UR QL: NEGATIVE NG/ML
BUPRENORPHINE UR-MCNC: NEGATIVE NG/ML
BZE UR QL: NEGATIVE NG/ML
CODEINE UR-MCNC: NEGATIVE NG/ML
CREAT UR-MCNC: 204.6 MG/DL
DRUG SCREEN COMMENT UR-IMP: ABNORMAL
DRUG SCREEN COMMENT UR-IMP: NORMAL
DRUG SCREEN COMMENT UR-IMP: NORMAL
EDDP UR-MCNC: NEGATIVE NG/ML
FENTANYL UR-MCNC: NEGATIVE NG/ML
HYDROCODONE UR-MCNC: NEGATIVE NG/ML
HYDROMORPHONE UR-MCNC: NEGATIVE NG/ML
LORAZEPAM UR-MCNC: NEGATIVE NG/ML
METHADONE UR-MCNC: NEGATIVE NG/ML
MORPHINE UR-MCNC: NEGATIVE NG/ML
NORBUPRENORPHINE UR-MCNC: NEGATIVE NG/ML
NORDIAZEPAM UR-MCNC: NEGATIVE NG/ML
NORFENTANYL UR-MCNC: NEGATIVE NG/ML
NORHYDROCODONE UR CFM-MCNC: NEGATIVE NG/ML
NOROXYCODONE UR CFM-MCNC: 2550 NG/ML
NORTAPENTADOL UR-MCNC: NEGATIVE NG/ML
NORTRAMADOL UR-MCNC: NEGATIVE NG/ML
OH-ETHYLFLURAZ UR-MCNC: NEGATIVE NG/ML
OXAZEPAM UR-MCNC: NEGATIVE NG/ML
OXIDANTS UR QL: NEGATIVE MCG/ML
OXYCODONE UR CFM-MCNC: 2580 NG/ML
OXYMORPHONE UR CFM-MCNC: 2760 NG/ML
PCP UR QL: NEGATIVE NG/ML
PH UR: 5.7 [PH] (ref 4.5–9)
QUEST 6 ACETYLMORPHINE: NEGATIVE NG/ML
QUEST NALOXONE, URINE: NEGATIVE NG/ML
QUEST NOTES AND COMMENTS: NORMAL
QUEST ZOLPIDEM: NEGATIVE NG/ML
TAPENTADOL UR-MCNC: NEGATIVE NG/ML
TEMAZEPAM UR-MCNC: NEGATIVE NG/ML
THC UR QL: NEGATIVE NG/ML
TRAMADOL UR-MCNC: NEGATIVE NG/ML
ZOLPIDEM PHENYL-4-CARB UR CFM-MCNC: NEGATIVE NG/ML

## 2025-07-08 DIAGNOSIS — M48.062 LUMBAR STENOSIS WITH NEUROGENIC CLAUDICATION: ICD-10-CM

## 2025-07-08 DIAGNOSIS — M96.1 CERVICAL POSTLAMINECTOMY SYNDROME: ICD-10-CM

## 2025-07-08 RX ORDER — OXYCODONE AND ACETAMINOPHEN 10; 325 MG/1; MG/1
1 TABLET ORAL 2 TIMES DAILY PRN
Qty: 60 TABLET | Refills: 0 | Status: SHIPPED | OUTPATIENT
Start: 2025-07-18 | End: 2025-08-17

## 2025-07-08 NOTE — TELEPHONE ENCOUNTER
Requested refill for Percocet 10/325mg BID #60 for 30 days.  LV: 6/19/25  NV: 9/18/25  [facility] LFD: 6/18/25  Last SD 6/18/25  Next SD: 7/18/25  Bethelamina Rodriguez

## 2025-08-11 DIAGNOSIS — M96.1 CERVICAL POSTLAMINECTOMY SYNDROME: ICD-10-CM

## 2025-08-11 DIAGNOSIS — M48.062 LUMBAR STENOSIS WITH NEUROGENIC CLAUDICATION: ICD-10-CM

## 2025-08-12 RX ORDER — OXYCODONE AND ACETAMINOPHEN 10; 325 MG/1; MG/1
1 TABLET ORAL 2 TIMES DAILY PRN
Qty: 60 TABLET | Refills: 0 | Status: SHIPPED | OUTPATIENT
Start: 2025-08-17 | End: 2025-09-16